# Patient Record
Sex: MALE | Race: OTHER | Employment: FULL TIME | URBAN - METROPOLITAN AREA
[De-identification: names, ages, dates, MRNs, and addresses within clinical notes are randomized per-mention and may not be internally consistent; named-entity substitution may affect disease eponyms.]

---

## 2022-04-26 ENCOUNTER — APPOINTMENT (OUTPATIENT)
Dept: CT IMAGING | Age: 42
DRG: 853 | End: 2022-04-26
Attending: EMERGENCY MEDICINE

## 2022-04-26 ENCOUNTER — ANESTHESIA EVENT (OUTPATIENT)
Dept: SURGERY | Age: 42
DRG: 853 | End: 2022-04-26

## 2022-04-26 ENCOUNTER — APPOINTMENT (OUTPATIENT)
Dept: NON INVASIVE DIAGNOSTICS | Age: 42
DRG: 853 | End: 2022-04-26
Attending: SURGERY

## 2022-04-26 ENCOUNTER — HOSPITAL ENCOUNTER (INPATIENT)
Age: 42
LOS: 2 days | Discharge: HOME | DRG: 853 | End: 2022-05-01
Attending: EMERGENCY MEDICINE | Admitting: SURGERY

## 2022-04-26 ENCOUNTER — ANESTHESIA (OUTPATIENT)
Dept: SURGERY | Age: 42
DRG: 853 | End: 2022-04-26

## 2022-04-26 DIAGNOSIS — K65.9 PERITONITIS (HCC): ICD-10-CM

## 2022-04-26 DIAGNOSIS — K35.32 RUPTURED APPENDICITIS: ICD-10-CM

## 2022-04-26 DIAGNOSIS — R10.31 RLQ ABDOMINAL PAIN: ICD-10-CM

## 2022-04-26 DIAGNOSIS — I48.0 PAF (PAROXYSMAL ATRIAL FIBRILLATION) (HCC): ICD-10-CM

## 2022-04-26 DIAGNOSIS — R77.8 ELEVATED TROPONIN: ICD-10-CM

## 2022-04-26 DIAGNOSIS — K35.32 ACUTE APPENDICITIS WITH PERFORATION AND LOCALIZED PERITONITIS, UNSPECIFIED WHETHER ABSCESS PRESENT, UNSPECIFIED WHETHER GANGRENE PRESENT: Primary | ICD-10-CM

## 2022-04-26 DIAGNOSIS — R94.31 EKG ABNORMALITIES: ICD-10-CM

## 2022-04-26 DIAGNOSIS — D72.829 LEUKOCYTOSIS, UNSPECIFIED TYPE: ICD-10-CM

## 2022-04-26 DIAGNOSIS — K35.32 ACUTE APPENDICITIS WITH RUPTURE: ICD-10-CM

## 2022-04-26 DIAGNOSIS — I21.4 NSTEMI (NON-ST ELEVATED MYOCARDIAL INFARCTION) (HCC): ICD-10-CM

## 2022-04-26 LAB
ALBUMIN SERPL-MCNC: 3.1 G/DL (ref 3.5–5)
ALBUMIN/GLOB SERPL: 0.7 {RATIO} (ref 1.2–3.5)
ALP SERPL-CCNC: 120 U/L (ref 50–136)
ALT SERPL-CCNC: 68 U/L (ref 12–65)
ANION GAP SERPL CALC-SCNC: 7 MMOL/L (ref 7–16)
AST SERPL-CCNC: 65 U/L (ref 15–37)
ATRIAL RATE: 66 BPM
ATRIAL RATE: 79 BPM
BASOPHILS # BLD: 0.1 K/UL (ref 0–0.2)
BASOPHILS NFR BLD: 0 % (ref 0–2)
BILIRUB SERPL-MCNC: 0.5 MG/DL (ref 0.2–1.1)
BUN SERPL-MCNC: 13 MG/DL (ref 6–23)
CALCIUM SERPL-MCNC: 8.6 MG/DL (ref 8.3–10.4)
CALCULATED P AXIS, ECG09: 61 DEGREES
CALCULATED P AXIS, ECG09: 63 DEGREES
CALCULATED R AXIS, ECG10: 74 DEGREES
CALCULATED R AXIS, ECG10: 74 DEGREES
CALCULATED T AXIS, ECG11: 71 DEGREES
CALCULATED T AXIS, ECG11: 78 DEGREES
CHLORIDE SERPL-SCNC: 104 MMOL/L (ref 98–107)
CO2 SERPL-SCNC: 27 MMOL/L (ref 21–32)
CREAT SERPL-MCNC: 1 MG/DL (ref 0.8–1.5)
DIAGNOSIS, 93000: NORMAL
DIAGNOSIS, 93000: NORMAL
DIFFERENTIAL METHOD BLD: ABNORMAL
ECHO AO ASC DIAM: 2.2 CM
ECHO AO ASCENDING AORTA INDEX: 1.18 CM/M2
ECHO AO ROOT DIAM: 2.6 CM
ECHO AO ROOT INDEX: 1.39 CM/M2
ECHO AV AREA PEAK VELOCITY: 2.3 CM2
ECHO AV AREA VTI: 2 CM2
ECHO AV AREA/BSA PEAK VELOCITY: 1.2 CM2/M2
ECHO AV AREA/BSA VTI: 1.1 CM2/M2
ECHO AV MEAN GRADIENT: 7 MMHG
ECHO AV MEAN VELOCITY: 1.2 M/S
ECHO AV PEAK GRADIENT: 12 MMHG
ECHO AV PEAK VELOCITY: 1.7 M/S
ECHO AV VELOCITY RATIO: 0.71
ECHO AV VTI: 30.4 CM
ECHO EST RA PRESSURE: 8 MMHG
ECHO IVC EXP: 1.5 CM
ECHO LA AREA 2C: 11.8 CM2
ECHO LA AREA 4C: 17.9 CM2
ECHO LA DIAMETER INDEX: 1.66 CM/M2
ECHO LA DIAMETER: 3.1 CM
ECHO LA MAJOR AXIS: 5.9 CM
ECHO LA MINOR AXIS: 3.9 CM
ECHO LA TO AORTIC ROOT RATIO: 1.19
ECHO LV E' LATERAL VELOCITY: 18 CM/S
ECHO LV E' SEPTAL VELOCITY: 13 CM/S
ECHO LV EDV A2C: 159 ML
ECHO LV EDV A4C: 127 ML
ECHO LV EDV INDEX A4C: 68 ML/M2
ECHO LV EDV NDEX A2C: 85 ML/M2
ECHO LV EJECTION FRACTION A2C: 54 %
ECHO LV EJECTION FRACTION A4C: 61 %
ECHO LV EJECTION FRACTION BIPLANE: 57 % (ref 55–100)
ECHO LV ESV A2C: 73 ML
ECHO LV ESV A4C: 49 ML
ECHO LV ESV INDEX A2C: 39 ML/M2
ECHO LV ESV INDEX A4C: 26 ML/M2
ECHO LV FRACTIONAL SHORTENING: 30 % (ref 28–44)
ECHO LV INTERNAL DIMENSION DIASTOLE INDEX: 2.46 CM/M2
ECHO LV INTERNAL DIMENSION DIASTOLIC: 4.6 CM (ref 4.2–5.9)
ECHO LV INTERNAL DIMENSION SYSTOLIC INDEX: 1.71 CM/M2
ECHO LV INTERNAL DIMENSION SYSTOLIC: 3.2 CM
ECHO LV IVSD: 0.9 CM (ref 0.6–1)
ECHO LV MASS 2D: 108.5 G (ref 88–224)
ECHO LV MASS INDEX 2D: 58 G/M2 (ref 49–115)
ECHO LV POSTERIOR WALL DIASTOLIC: 0.6 CM (ref 0.6–1)
ECHO LV RELATIVE WALL THICKNESS RATIO: 0.26
ECHO LVOT AREA: 3.1 CM2
ECHO LVOT AV VTI INDEX: 0.65
ECHO LVOT DIAM: 2 CM
ECHO LVOT MEAN GRADIENT: 3 MMHG
ECHO LVOT PEAK GRADIENT: 6 MMHG
ECHO LVOT PEAK VELOCITY: 1.2 M/S
ECHO LVOT STROKE VOLUME INDEX: 33.1 ML/M2
ECHO LVOT SV: 61.9 ML
ECHO LVOT VTI: 19.7 CM
ECHO MV A VELOCITY: 0.7 M/S
ECHO MV E DECELERATION TIME (DT): 185 MS
ECHO MV E VELOCITY: 1.08 M/S
ECHO MV E/A RATIO: 1.54
ECHO MV E/E' LATERAL: 6
ECHO MV E/E' RATIO (AVERAGED): 7.15
ECHO MV E/E' SEPTAL: 8.31
ECHO PV ACCELERATION TIME (AT): 148 MS
ECHO PV MAX VELOCITY: 1.4 M/S
ECHO PV PEAK GRADIENT: 7 MMHG
ECHO RIGHT VENTRICULAR SYSTOLIC PRESSURE (RVSP): 33 MMHG
ECHO RV BASAL DIMENSION: 3.5 CM
ECHO RV INTERNAL DIMENSION: 2.6 CM
ECHO RV TAPSE: 1.7 CM (ref 1.7–?)
ECHO TV REGURGITANT MAX VELOCITY: 2.48 M/S
ECHO TV REGURGITANT PEAK GRADIENT: 25 MMHG
EOSINOPHIL # BLD: 0 K/UL (ref 0–0.8)
EOSINOPHIL NFR BLD: 0 % (ref 0.5–7.8)
ERYTHROCYTE [DISTWIDTH] IN BLOOD BY AUTOMATED COUNT: 13.1 % (ref 11.9–14.6)
GLOBULIN SER CALC-MCNC: 4.4 G/DL (ref 2.3–3.5)
GLUCOSE SERPL-MCNC: 125 MG/DL (ref 65–100)
HCT VFR BLD AUTO: 41.2 % (ref 41.1–50.3)
HGB BLD-MCNC: 13.5 G/DL (ref 13.6–17.2)
IMM GRANULOCYTES # BLD AUTO: 0.1 K/UL (ref 0–0.5)
IMM GRANULOCYTES NFR BLD AUTO: 1 % (ref 0–5)
LACTATE SERPL-SCNC: 0.7 MMOL/L (ref 0.4–2)
LIPASE SERPL-CCNC: 300 U/L (ref 73–393)
LYMPHOCYTES # BLD: 1.5 K/UL (ref 0.5–4.6)
LYMPHOCYTES NFR BLD: 9 % (ref 13–44)
MCH RBC QN AUTO: 29.6 PG (ref 26.1–32.9)
MCHC RBC AUTO-ENTMCNC: 32.8 G/DL (ref 31.4–35)
MCV RBC AUTO: 90.4 FL (ref 79.6–97.8)
MONOCYTES # BLD: 1.3 K/UL (ref 0.1–1.3)
MONOCYTES NFR BLD: 7 % (ref 4–12)
NEUTS SEG # BLD: 14.9 K/UL (ref 1.7–8.2)
NEUTS SEG NFR BLD: 83 % (ref 43–78)
NRBC # BLD: 0 K/UL (ref 0–0.2)
P-R INTERVAL, ECG05: 126 MS
P-R INTERVAL, ECG05: 126 MS
PLATELET # BLD AUTO: 209 K/UL (ref 150–450)
PMV BLD AUTO: 11.4 FL (ref 9.4–12.3)
POTASSIUM SERPL-SCNC: 3.6 MMOL/L (ref 3.5–5.1)
PROT SERPL-MCNC: 7.5 G/DL (ref 6.3–8.2)
Q-T INTERVAL, ECG07: 348 MS
Q-T INTERVAL, ECG07: 368 MS
QRS DURATION, ECG06: 86 MS
QRS DURATION, ECG06: 88 MS
QTC CALCULATION (BEZET), ECG08: 385 MS
QTC CALCULATION (BEZET), ECG08: 399 MS
RBC # BLD AUTO: 4.56 M/UL (ref 4.23–5.6)
SODIUM SERPL-SCNC: 138 MMOL/L (ref 136–145)
TROPONIN-HIGH SENSITIVITY: 3553.4 PG/ML (ref 0–14)
TROPONIN-HIGH SENSITIVITY: 5619.2 PG/ML (ref 0–14)
VENTRICULAR RATE, ECG03: 66 BPM
VENTRICULAR RATE, ECG03: 79 BPM
WBC # BLD AUTO: 18 K/UL (ref 4.3–11.1)

## 2022-04-26 PROCEDURE — 74011000258 HC RX REV CODE- 258: Performed by: EMERGENCY MEDICINE

## 2022-04-26 PROCEDURE — 85025 COMPLETE CBC W/AUTO DIFF WBC: CPT

## 2022-04-26 PROCEDURE — 77030007955 HC PCH ENDOSC SPEC J&J -B: Performed by: SURGERY

## 2022-04-26 PROCEDURE — 77030034849: Performed by: SURGERY

## 2022-04-26 PROCEDURE — 74011000250 HC RX REV CODE- 250: Performed by: STUDENT IN AN ORGANIZED HEALTH CARE EDUCATION/TRAINING PROGRAM

## 2022-04-26 PROCEDURE — 76210000016 HC OR PH I REC 1 TO 1.5 HR: Performed by: SURGERY

## 2022-04-26 PROCEDURE — 70450 CT HEAD/BRAIN W/O DYE: CPT

## 2022-04-26 PROCEDURE — 76010000161 HC OR TIME 1 TO 1.5 HR INTENSV-TIER 1: Performed by: SURGERY

## 2022-04-26 PROCEDURE — G0378 HOSPITAL OBSERVATION PER HR: HCPCS

## 2022-04-26 PROCEDURE — 77030008608 HC TRCR ENDOSC SMTH AMR -B: Performed by: SURGERY

## 2022-04-26 PROCEDURE — 77030040504 HC DRN WND MDII -B: Performed by: SURGERY

## 2022-04-26 PROCEDURE — 96375 TX/PRO/DX INJ NEW DRUG ADDON: CPT

## 2022-04-26 PROCEDURE — 80053 COMPREHEN METABOLIC PANEL: CPT

## 2022-04-26 PROCEDURE — 77030039425 HC BLD LARYNG TRULITE DISP TELE -A: Performed by: ANESTHESIOLOGY

## 2022-04-26 PROCEDURE — 74011250636 HC RX REV CODE- 250/636: Performed by: NURSE ANESTHETIST, CERTIFIED REGISTERED

## 2022-04-26 PROCEDURE — 74011250636 HC RX REV CODE- 250/636: Performed by: ANESTHESIOLOGY

## 2022-04-26 PROCEDURE — 77030008606 HC TRCR ENDOSC KII AMR -B: Performed by: SURGERY

## 2022-04-26 PROCEDURE — 93005 ELECTROCARDIOGRAM TRACING: CPT | Performed by: EMERGENCY MEDICINE

## 2022-04-26 PROCEDURE — 77030000038 HC TIP SCIS LAPSCP SURI -B: Performed by: SURGERY

## 2022-04-26 PROCEDURE — 77030008756 HC TU IRR SUC STRY -B: Performed by: SURGERY

## 2022-04-26 PROCEDURE — 74177 CT ABD & PELVIS W/CONTRAST: CPT

## 2022-04-26 PROCEDURE — 2709999900 HC NON-CHARGEABLE SUPPLY: Performed by: SURGERY

## 2022-04-26 PROCEDURE — 77030021158 HC TRCR BLN GELPRT AMR -B: Performed by: SURGERY

## 2022-04-26 PROCEDURE — 83690 ASSAY OF LIPASE: CPT

## 2022-04-26 PROCEDURE — 77030020829: Performed by: SURGERY

## 2022-04-26 PROCEDURE — 74011250636 HC RX REV CODE- 250/636: Performed by: SURGERY

## 2022-04-26 PROCEDURE — 96376 TX/PRO/DX INJ SAME DRUG ADON: CPT

## 2022-04-26 PROCEDURE — 44970 LAPAROSCOPY APPENDECTOMY: CPT | Performed by: SURGERY

## 2022-04-26 PROCEDURE — 87075 CULTR BACTERIA EXCEPT BLOOD: CPT

## 2022-04-26 PROCEDURE — 74011000250 HC RX REV CODE- 250: Performed by: NURSE ANESTHETIST, CERTIFIED REGISTERED

## 2022-04-26 PROCEDURE — 77030040506 HC DRN WND MDII -A: Performed by: SURGERY

## 2022-04-26 PROCEDURE — 99220 PR INITIAL OBSERVATION CARE/DAY 70 MINUTES: CPT | Performed by: SURGERY

## 2022-04-26 PROCEDURE — 77030008462 HC STPLR SKN PROX J&J -A: Performed by: SURGERY

## 2022-04-26 PROCEDURE — 77030009967 HC RELD STPLR ENDOSC J&J -C: Performed by: SURGERY

## 2022-04-26 PROCEDURE — 74011000250 HC RX REV CODE- 250: Performed by: SURGERY

## 2022-04-26 PROCEDURE — 81003 URINALYSIS AUTO W/O SCOPE: CPT

## 2022-04-26 PROCEDURE — 84484 ASSAY OF TROPONIN QUANT: CPT

## 2022-04-26 PROCEDURE — 77030031139 HC SUT VCRL2 J&J -A: Performed by: SURGERY

## 2022-04-26 PROCEDURE — 87070 CULTURE OTHR SPECIMN AEROBIC: CPT

## 2022-04-26 PROCEDURE — 74011250636 HC RX REV CODE- 250/636: Performed by: EMERGENCY MEDICINE

## 2022-04-26 PROCEDURE — 96365 THER/PROPH/DIAG IV INF INIT: CPT

## 2022-04-26 PROCEDURE — 99285 EMERGENCY DEPT VISIT HI MDM: CPT

## 2022-04-26 PROCEDURE — 77030008522 HC TBNG INSUF LAPRO STRY -B: Performed by: SURGERY

## 2022-04-26 PROCEDURE — 76060000033 HC ANESTHESIA 1 TO 1.5 HR: Performed by: SURGERY

## 2022-04-26 PROCEDURE — 77030002996 HC SUT SLK J&J -A: Performed by: SURGERY

## 2022-04-26 PROCEDURE — 74011000258 HC RX REV CODE- 258: Performed by: SURGERY

## 2022-04-26 PROCEDURE — 77030022703 HC LIGASURE  BLNT LAPSCP SEAL COVD -E: Performed by: SURGERY

## 2022-04-26 PROCEDURE — 88304 TISSUE EXAM BY PATHOLOGIST: CPT

## 2022-04-26 PROCEDURE — 74011000636 HC RX REV CODE- 636: Performed by: EMERGENCY MEDICINE

## 2022-04-26 PROCEDURE — 87040 BLOOD CULTURE FOR BACTERIA: CPT

## 2022-04-26 PROCEDURE — 87077 CULTURE AEROBIC IDENTIFY: CPT

## 2022-04-26 PROCEDURE — C8929 TTE W OR WO FOL WCON,DOPPLER: HCPCS

## 2022-04-26 PROCEDURE — 87186 SC STD MICRODIL/AGAR DIL: CPT

## 2022-04-26 PROCEDURE — 74011250637 HC RX REV CODE- 250/637: Performed by: ANESTHESIOLOGY

## 2022-04-26 PROCEDURE — 74011250637 HC RX REV CODE- 250/637: Performed by: SURGERY

## 2022-04-26 PROCEDURE — 83605 ASSAY OF LACTIC ACID: CPT

## 2022-04-26 PROCEDURE — 99222 1ST HOSP IP/OBS MODERATE 55: CPT | Performed by: INTERNAL MEDICINE

## 2022-04-26 PROCEDURE — 77030040830 HC CATH URETH FOL MDII -A: Performed by: SURGERY

## 2022-04-26 PROCEDURE — 77030011810 HC STPLR ENDOSC J&J -G: Performed by: SURGERY

## 2022-04-26 PROCEDURE — 77030037088 HC TUBE ENDOTRACH ORAL NSL COVD-A: Performed by: ANESTHESIOLOGY

## 2022-04-26 PROCEDURE — 0DTJ4ZZ RESECTION OF APPENDIX, PERCUTANEOUS ENDOSCOPIC APPROACH: ICD-10-PCS | Performed by: SURGERY

## 2022-04-26 PROCEDURE — 77030008771 HC TU NG SALEM SUMP -A: Performed by: ANESTHESIOLOGY

## 2022-04-26 PROCEDURE — 77030012770 HC TRCR OPT FX AMR -B: Performed by: SURGERY

## 2022-04-26 RX ORDER — OXYCODONE HYDROCHLORIDE 5 MG/1
10 TABLET ORAL
Status: COMPLETED | OUTPATIENT
Start: 2022-04-26 | End: 2022-04-26

## 2022-04-26 RX ORDER — ONDANSETRON 2 MG/ML
4 INJECTION INTRAMUSCULAR; INTRAVENOUS
Status: COMPLETED | OUTPATIENT
Start: 2022-04-26 | End: 2022-04-26

## 2022-04-26 RX ORDER — ROCURONIUM BROMIDE 10 MG/ML
INJECTION, SOLUTION INTRAVENOUS AS NEEDED
Status: DISCONTINUED | OUTPATIENT
Start: 2022-04-26 | End: 2022-04-26 | Stop reason: HOSPADM

## 2022-04-26 RX ORDER — LIDOCAINE HYDROCHLORIDE 20 MG/ML
INJECTION, SOLUTION EPIDURAL; INFILTRATION; INTRACAUDAL; PERINEURAL AS NEEDED
Status: DISCONTINUED | OUTPATIENT
Start: 2022-04-26 | End: 2022-04-26 | Stop reason: HOSPADM

## 2022-04-26 RX ORDER — SODIUM CHLORIDE 0.9 % (FLUSH) 0.9 %
10 SYRINGE (ML) INJECTION
Status: COMPLETED | OUTPATIENT
Start: 2022-04-26 | End: 2022-04-26

## 2022-04-26 RX ORDER — SODIUM CHLORIDE 0.9 % (FLUSH) 0.9 %
5-10 SYRINGE (ML) INJECTION EVERY 8 HOURS
Status: DISCONTINUED | OUTPATIENT
Start: 2022-04-26 | End: 2022-05-01 | Stop reason: HOSPADM

## 2022-04-26 RX ORDER — SODIUM CHLORIDE 0.9 % (FLUSH) 0.9 %
5-10 SYRINGE (ML) INJECTION AS NEEDED
Status: DISCONTINUED | OUTPATIENT
Start: 2022-04-26 | End: 2022-05-01 | Stop reason: HOSPADM

## 2022-04-26 RX ORDER — SODIUM CHLORIDE, SODIUM LACTATE, POTASSIUM CHLORIDE, CALCIUM CHLORIDE 600; 310; 30; 20 MG/100ML; MG/100ML; MG/100ML; MG/100ML
100 INJECTION, SOLUTION INTRAVENOUS CONTINUOUS
Status: DISCONTINUED | OUTPATIENT
Start: 2022-04-26 | End: 2022-04-26 | Stop reason: HOSPADM

## 2022-04-26 RX ORDER — FAMOTIDINE 20 MG/1
20 TABLET, FILM COATED ORAL 2 TIMES DAILY
COMMUNITY

## 2022-04-26 RX ORDER — HYDROCODONE BITARTRATE AND ACETAMINOPHEN 5; 325 MG/1; MG/1
1-2 TABLET ORAL
Qty: 28 TABLET | Refills: 0 | Status: SHIPPED | OUTPATIENT
Start: 2022-04-26 | End: 2022-05-03

## 2022-04-26 RX ORDER — NEOSTIGMINE METHYLSULFATE 1 MG/ML
INJECTION, SOLUTION INTRAVENOUS AS NEEDED
Status: DISCONTINUED | OUTPATIENT
Start: 2022-04-26 | End: 2022-04-26 | Stop reason: HOSPADM

## 2022-04-26 RX ORDER — PROPOFOL 10 MG/ML
INJECTION, EMULSION INTRAVENOUS AS NEEDED
Status: DISCONTINUED | OUTPATIENT
Start: 2022-04-26 | End: 2022-04-26 | Stop reason: HOSPADM

## 2022-04-26 RX ORDER — SODIUM CHLORIDE, SODIUM LACTATE, POTASSIUM CHLORIDE, CALCIUM CHLORIDE 600; 310; 30; 20 MG/100ML; MG/100ML; MG/100ML; MG/100ML
150 INJECTION, SOLUTION INTRAVENOUS CONTINUOUS
Status: DISCONTINUED | OUTPATIENT
Start: 2022-04-26 | End: 2022-04-27

## 2022-04-26 RX ORDER — EPHEDRINE SULFATE/0.9% NACL/PF 50 MG/5 ML
SYRINGE (ML) INTRAVENOUS AS NEEDED
Status: DISCONTINUED | OUTPATIENT
Start: 2022-04-26 | End: 2022-04-26 | Stop reason: HOSPADM

## 2022-04-26 RX ORDER — SUCCINYLCHOLINE CHLORIDE 20 MG/ML
INJECTION INTRAMUSCULAR; INTRAVENOUS AS NEEDED
Status: DISCONTINUED | OUTPATIENT
Start: 2022-04-26 | End: 2022-04-26 | Stop reason: HOSPADM

## 2022-04-26 RX ORDER — GLYCOPYRROLATE 0.2 MG/ML
INJECTION INTRAMUSCULAR; INTRAVENOUS AS NEEDED
Status: DISCONTINUED | OUTPATIENT
Start: 2022-04-26 | End: 2022-04-26 | Stop reason: HOSPADM

## 2022-04-26 RX ORDER — DICLOFENAC SODIUM 50 MG/1
50 TABLET, DELAYED RELEASE ORAL 2 TIMES DAILY
COMMUNITY

## 2022-04-26 RX ORDER — AMOXICILLIN AND CLAVULANATE POTASSIUM 875; 125 MG/1; MG/1
1 TABLET, FILM COATED ORAL 2 TIMES DAILY
COMMUNITY

## 2022-04-26 RX ORDER — OXYCODONE HYDROCHLORIDE 5 MG/1
5-10 TABLET ORAL
Status: DISCONTINUED | OUTPATIENT
Start: 2022-04-26 | End: 2022-05-01 | Stop reason: HOSPADM

## 2022-04-26 RX ORDER — MORPHINE SULFATE 4 MG/ML
2-6 INJECTION INTRAVENOUS
Status: DISCONTINUED | OUTPATIENT
Start: 2022-04-26 | End: 2022-05-01 | Stop reason: HOSPADM

## 2022-04-26 RX ORDER — ONDANSETRON 4 MG/1
4 TABLET, FILM COATED ORAL
COMMUNITY

## 2022-04-26 RX ORDER — MORPHINE SULFATE 4 MG/ML
4 INJECTION INTRAVENOUS ONCE
Status: COMPLETED | OUTPATIENT
Start: 2022-04-26 | End: 2022-04-26

## 2022-04-26 RX ORDER — ONDANSETRON 2 MG/ML
INJECTION INTRAMUSCULAR; INTRAVENOUS AS NEEDED
Status: DISCONTINUED | OUTPATIENT
Start: 2022-04-26 | End: 2022-04-26 | Stop reason: HOSPADM

## 2022-04-26 RX ORDER — BUPIVACAINE HYDROCHLORIDE 2.5 MG/ML
INJECTION, SOLUTION EPIDURAL; INFILTRATION; INTRACAUDAL AS NEEDED
Status: DISCONTINUED | OUTPATIENT
Start: 2022-04-26 | End: 2022-04-26 | Stop reason: HOSPADM

## 2022-04-26 RX ORDER — ACETAMINOPHEN 500 MG
1000 TABLET ORAL
Status: DISCONTINUED | OUTPATIENT
Start: 2022-04-26 | End: 2022-05-01 | Stop reason: HOSPADM

## 2022-04-26 RX ORDER — PROCHLORPERAZINE EDISYLATE 5 MG/ML
2.5 INJECTION INTRAMUSCULAR; INTRAVENOUS
Status: DISCONTINUED | OUTPATIENT
Start: 2022-04-26 | End: 2022-04-26 | Stop reason: HOSPADM

## 2022-04-26 RX ORDER — HYDROMORPHONE HYDROCHLORIDE 1 MG/ML
0.5 INJECTION, SOLUTION INTRAMUSCULAR; INTRAVENOUS; SUBCUTANEOUS
Status: DISCONTINUED | OUTPATIENT
Start: 2022-04-26 | End: 2022-04-26 | Stop reason: HOSPADM

## 2022-04-26 RX ORDER — FENTANYL CITRATE 50 UG/ML
INJECTION, SOLUTION INTRAMUSCULAR; INTRAVENOUS AS NEEDED
Status: DISCONTINUED | OUTPATIENT
Start: 2022-04-26 | End: 2022-04-26 | Stop reason: HOSPADM

## 2022-04-26 RX ADMIN — MORPHINE SULFATE 4 MG: 4 INJECTION INTRAVENOUS at 21:45

## 2022-04-26 RX ADMIN — ONDANSETRON 4 MG: 2 INJECTION INTRAMUSCULAR; INTRAVENOUS at 04:02

## 2022-04-26 RX ADMIN — SODIUM CHLORIDE 1000 ML: 900 INJECTION, SOLUTION INTRAVENOUS at 05:23

## 2022-04-26 RX ADMIN — Medication 10 MG: at 07:07

## 2022-04-26 RX ADMIN — IOPAMIDOL 100 ML: 755 INJECTION, SOLUTION INTRAVENOUS at 04:41

## 2022-04-26 RX ADMIN — SODIUM CHLORIDE 100 ML: 9 INJECTION, SOLUTION INTRAVENOUS at 04:40

## 2022-04-26 RX ADMIN — OXYCODONE HYDROCHLORIDE 10 MG: 5 TABLET ORAL at 13:20

## 2022-04-26 RX ADMIN — PIPERACILLIN AND TAZOBACTAM 4.5 G: 4; .5 INJECTION, POWDER, LYOPHILIZED, FOR SOLUTION INTRAVENOUS at 05:25

## 2022-04-26 RX ADMIN — Medication 10 ML: at 04:41

## 2022-04-26 RX ADMIN — OXYCODONE HYDROCHLORIDE 10 MG: 5 TABLET ORAL at 19:27

## 2022-04-26 RX ADMIN — MORPHINE SULFATE 4 MG: 4 INJECTION INTRAVENOUS at 23:23

## 2022-04-26 RX ADMIN — SUCCINYLCHOLINE CHLORIDE 140 MG: 20 INJECTION, SOLUTION INTRAMUSCULAR; INTRAVENOUS at 06:51

## 2022-04-26 RX ADMIN — PIPERACILLIN AND TAZOBACTAM 3.38 G: 3; .375 INJECTION, POWDER, LYOPHILIZED, FOR SOLUTION INTRAVENOUS at 20:24

## 2022-04-26 RX ADMIN — ROCURONIUM BROMIDE 15 MG: 50 INJECTION, SOLUTION INTRAVENOUS at 06:59

## 2022-04-26 RX ADMIN — HYDROMORPHONE HYDROCHLORIDE 0.5 MG: 1 INJECTION, SOLUTION INTRAMUSCULAR; INTRAVENOUS; SUBCUTANEOUS at 08:20

## 2022-04-26 RX ADMIN — FENTANYL CITRATE 100 MCG: 50 INJECTION INTRAMUSCULAR; INTRAVENOUS at 06:51

## 2022-04-26 RX ADMIN — LIDOCAINE HYDROCHLORIDE 80 MG: 20 INJECTION, SOLUTION EPIDURAL; INFILTRATION; INTRACAUDAL; PERINEURAL at 06:51

## 2022-04-26 RX ADMIN — SODIUM CHLORIDE 1000 ML: 900 INJECTION, SOLUTION INTRAVENOUS at 04:02

## 2022-04-26 RX ADMIN — SODIUM CHLORIDE, PRESERVATIVE FREE 10 ML: 5 INJECTION INTRAVENOUS at 20:25

## 2022-04-26 RX ADMIN — SODIUM CHLORIDE 500 ML: 900 INJECTION, SOLUTION INTRAVENOUS at 06:18

## 2022-04-26 RX ADMIN — OXYCODONE HYDROCHLORIDE 10 MG: 5 TABLET ORAL at 08:44

## 2022-04-26 RX ADMIN — Medication 5 MG: at 07:51

## 2022-04-26 RX ADMIN — ROCURONIUM BROMIDE 5 MG: 50 INJECTION, SOLUTION INTRAVENOUS at 06:51

## 2022-04-26 RX ADMIN — GLYCOPYRROLATE 0.6 MG: 0.2 INJECTION, SOLUTION INTRAMUSCULAR; INTRAVENOUS at 07:51

## 2022-04-26 RX ADMIN — PIPERACILLIN AND TAZOBACTAM 3.38 G: 3; .375 INJECTION, POWDER, LYOPHILIZED, FOR SOLUTION INTRAVENOUS at 13:20

## 2022-04-26 RX ADMIN — SODIUM CHLORIDE, SODIUM LACTATE, POTASSIUM CHLORIDE, AND CALCIUM CHLORIDE: 600; 310; 30; 20 INJECTION, SOLUTION INTRAVENOUS at 06:46

## 2022-04-26 RX ADMIN — SODIUM CHLORIDE, PRESERVATIVE FREE 20 MG: 5 INJECTION INTRAVENOUS at 20:24

## 2022-04-26 RX ADMIN — MORPHINE SULFATE 4 MG: 4 INJECTION INTRAVENOUS at 05:06

## 2022-04-26 RX ADMIN — SODIUM CHLORIDE, PRESERVATIVE FREE 20 MG: 5 INJECTION INTRAVENOUS at 08:26

## 2022-04-26 RX ADMIN — PERFLUTREN 1 ML: 6.52 INJECTION, SUSPENSION INTRAVENOUS at 10:49

## 2022-04-26 RX ADMIN — ONDANSETRON 4 MG: 2 INJECTION INTRAMUSCULAR; INTRAVENOUS at 07:50

## 2022-04-26 RX ADMIN — HYDROMORPHONE HYDROCHLORIDE 0.5 MG: 1 INJECTION, SOLUTION INTRAMUSCULAR; INTRAVENOUS; SUBCUTANEOUS at 08:25

## 2022-04-26 RX ADMIN — ONDANSETRON 4 MG: 2 INJECTION INTRAMUSCULAR; INTRAVENOUS at 05:07

## 2022-04-26 RX ADMIN — SODIUM CHLORIDE, SODIUM LACTATE, POTASSIUM CHLORIDE, AND CALCIUM CHLORIDE: 600; 310; 30; 20 INJECTION, SOLUTION INTRAVENOUS at 07:27

## 2022-04-26 RX ADMIN — PROPOFOL 200 MG: 10 INJECTION, EMULSION INTRAVENOUS at 06:51

## 2022-04-26 RX ADMIN — ROCURONIUM BROMIDE 10 MG: 50 INJECTION, SOLUTION INTRAVENOUS at 07:13

## 2022-04-26 RX ADMIN — SODIUM CHLORIDE, SODIUM LACTATE, POTASSIUM CHLORIDE, AND CALCIUM CHLORIDE 150 ML/HR: 600; 310; 30; 20 INJECTION, SOLUTION INTRAVENOUS at 23:28

## 2022-04-26 NOTE — PROGRESS NOTES
Report received from [facility] in PACU. Will receive patient into 346 when he arrives on the unit.

## 2022-04-26 NOTE — ED PROVIDER NOTES
28-year-old male with no pertinent past medical history presents with complaint of right lower quadrant pain with associated nausea, vomiting, subjective fever and chills have been intermittent since Friday. States that symptoms initially started in epigastric region/periumbilical region and progressed and now he is having right lower quadrant pain. Denies diarrhea. States that he had hard stool earlier today. Denies dysuria, hematuria, flank pain. Patient denies cough, chest pain, shortness of breath. Denies dysuria. Denies any previous abdominal surgeries. Patient also states that earlier today he had episode of left hand tingling that is since resolved. Denies facial droop, slurred speech, vision disturbance, neck pain. Denies history of TIA, CVA. Denies history of CAD. The history is provided by the patient. No  was used. Abdominal Pain   Associated symptoms include a fever and nausea. Pertinent negatives include no diarrhea, no vomiting, no constipation, no dysuria, no headaches, no arthralgias, no myalgias, no chest pain and no back pain. History reviewed. No pertinent past medical history. No past surgical history on file. History reviewed. No pertinent family history. Social History     Socioeconomic History    Marital status: SINGLE     Spouse name: Not on file    Number of children: Not on file    Years of education: Not on file    Highest education level: Not on file   Occupational History    Not on file   Tobacco Use    Smoking status: Never Smoker    Smokeless tobacco: Never Used   Substance and Sexual Activity    Alcohol use:  Yes    Drug use: Not on file    Sexual activity: Yes     Partners: Female   Other Topics Concern    Not on file   Social History Narrative    Not on file     Social Determinants of Health     Financial Resource Strain:     Difficulty of Paying Living Expenses: Not on file   Food Insecurity:     Worried About Running Out of Food in the Last Year: Not on file    Ran Out of Food in the Last Year: Not on file   Transportation Needs:     Lack of Transportation (Medical): Not on file    Lack of Transportation (Non-Medical): Not on file   Physical Activity:     Days of Exercise per Week: Not on file    Minutes of Exercise per Session: Not on file   Stress:     Feeling of Stress : Not on file   Social Connections:     Frequency of Communication with Friends and Family: Not on file    Frequency of Social Gatherings with Friends and Family: Not on file    Attends Judaism Services: Not on file    Active Member of [address] [place] [address] [employer] or Organizations: Not on file    Attends Club or Organization Meetings: Not on file    Marital Status: Not on file   Intimate Partner Violence:     Fear of Current or Ex-Partner: Not on file    Emotionally Abused: Not on file    Physically Abused: Not on file    Sexually Abused: Not on file   Housing Stability:     Unable to Pay for Housing in the Last Year: Not on file    Number of [place] in the Last Year: Not on file    Unstable Housing in the Last Year: Not on file         ALLERGIES: Patient has no known allergies. Review of Systems   Constitutional: Positive for chills, fatigue and fever. Negative for diaphoresis. HENT: Negative for congestion, rhinorrhea, sore throat, trouble swallowing and voice change. Respiratory: Negative for cough and shortness of breath. Cardiovascular: Negative for chest pain and palpitations. Gastrointestinal: Positive for abdominal pain and nausea. Negative for blood in stool, constipation, diarrhea, rectal pain and vomiting. Genitourinary: Negative for dysuria and flank pain. Musculoskeletal: Negative for arthralgias, back pain, myalgias, neck pain and neck stiffness. Skin: Negative for rash and wound. Neurological: Negative for tremors, seizures, syncope, facial asymmetry, speech difficulty, weakness and headaches.    Hematological: Does not bruise/bleed easily. Psychiatric/Behavioral: Negative for confusion. Vitals:    04/26/22 0313   BP: 129/66   Pulse: 98   Resp: 16   Temp: 99.5 °F (37.5 °C)   SpO2: 97%   Weight: 69.4 kg (153 lb)   Height: 5' 7\" (1.702 m)            Physical Exam  Vitals and nursing note reviewed. Constitutional:       Appearance: He is well-developed. HENT:      Head: Normocephalic. Mouth/Throat:      Mouth: Mucous membranes are moist.      Pharynx: Oropharynx is clear. Eyes:      Extraocular Movements: Extraocular movements intact. Pupils: Pupils are equal, round, and reactive to light. Comments: No nystagmus. Cardiovascular:      Rate and Rhythm: Normal rate and regular rhythm. Heart sounds: Normal heart sounds. Comments: Pulses 2+ throughout. Pulmonary:      Effort: Pulmonary effort is normal.      Breath sounds: Normal breath sounds. Abdominal:      General: Abdomen is flat. Bowel sounds are normal.      Palpations: Abdomen is soft. Tenderness: There is abdominal tenderness in the right lower quadrant. There is no right CVA tenderness, left CVA tenderness, guarding or rebound. Negative signs include Marcano's sign. Comments: Moderate right lower quadrant tenderness to palpation. No rebound or guarding. No CVA tenderness. Skin:     General: Skin is warm. Coloration: Skin is not pale. Findings: No rash. Neurological:      General: No focal deficit present. Mental Status: He is alert and oriented to person, place, and time. Cranial Nerves: No cranial nerve deficit. Motor: No weakness. Comments: Strength 5/5 throughout. Normal sensory exam. No focal deficits. No drift.  strength 5/5 bilaterally. No dysarthria.   No aphasia          MDM  Number of Diagnoses or Management Options  Acute appendicitis with perforation and localized peritonitis, unspecified whether abscess present, unspecified whether gangrene present: new and requires workup  NSTEMI (non-ST elevated myocardial infarction) [facility]): new and requires workup  Diagnosis management comments: Vital signs stable. Leukocytosis noted. CT abdomen pelvis with findings of acute appendicitis with likely perforation. CT head unremarkable. Patient NPO. Patient given 30 cc/kg IV fluid bolus and cover with Zosyn 4.5 g IV. EKG with normal sinus rhythm. J-point elevation and findings consistent with early repolarization. Initial troponin was noted to be significantly elevated at 3553. 4. Contacted [facility] cardiology and spoke with Dr. Misha Garcia. States not significant concern from a cardiac standpoint and to have team order echo but do not need to delay the care he needs in regards to his underlying appendicitis. Patient denies chest pain, shortness of breath. States elevated troponin could be secondary to extremis/pain associated with appendicitis. Recommends hold heparin at this time. Updated general surgery on plan.          Amount and/or Complexity of Data Reviewed  Clinical lab tests: ordered and reviewed  Tests in the radiology section of CPT®: ordered and reviewed  Tests in the medicine section of CPT®: ordered and reviewed  Discuss the patient with other providers: yes  Independent visualization of images, tracings, or specimens: yes    Risk of Complications, Morbidity, and/or Mortality  Presenting problems: high  Diagnostic procedures: high  Management options: high  General comments: Results Include:    Recent Results (from the past 24 hour(s))  -CBC WITH AUTOMATED DIFF:   Collection Time: 04/26/22  3:34 AM       Result                      Value             Ref Range           WBC                         18.0 (H)          4.3 - 11.1 K*       RBC                         4.56              4.23 - 5.6 M*       HGB                         13.5 (L)          13.6 - 17.2 *       HCT                         41.2              41.1 - 50.3 %       MCV                         90.4 79.6 - 97.8 *       MCH                         29.6              26.1 - 32.9 *       MCHC                        32.8              31.4 - 35.0 *       RDW                         13.1              11.9 - 14.6 %       PLATELET                    209               150 - 450 K/*       MPV                         11.4              9.4 - 12.3 FL       ABSOLUTE NRBC               0.00              0.0 - 0.2 K/*       DF                          AUTOMATED                             NEUTROPHILS                 83 (H)            43 - 78 %           LYMPHOCYTES                 9 (L)             13 - 44 %           MONOCYTES                   7                 4.0 - 12.0 %        EOSINOPHILS                 0 (L)             0.5 - 7.8 %         BASOPHILS                   0                 0.0 - 2.0 %         IMMATURE GRANULOCYTES       1                 0.0 - 5.0 %         ABS. NEUTROPHILS            14.9 (H)          1.7 - 8.2 K/*       ABS. LYMPHOCYTES            1.5               0.5 - 4.6 K/*       ABS. MONOCYTES              1.3               0.1 - 1.3 K/*       ABS. EOSINOPHILS            0.0               0.0 - 0.8 K/*       ABS. BASOPHILS              0.1               0.0 - 0.2 K/*       ABS. IMM.  GRANS.            0.1               0.0 - 0.5 K/*  -METABOLIC PANEL, COMPREHENSIVE:   Collection Time: 04/26/22  3:34 AM       Result                      Value             Ref Range           Sodium                      138               136 - 145 mm*       Potassium                   3.6               3.5 - 5.1 mm*       Chloride                    104               98 - 107 mmo*       CO2                         27                21 - 32 mmol*       Anion gap                   7                 7 - 16 mmol/L       Glucose                     125 (H)           65 - 100 mg/*       BUN                         13                6 - 23 MG/DL        Creatinine                  1.00              0.8 - 1.5 MG*       GFR est AA                  >60               >60 ml/min/1*       GFR est non-AA              >60               >60 ml/min/1*       Calcium                     8.6               8.3 - 10.4 M*       Bilirubin, total            0.5               0.2 - 1.1 MG*       ALT (SGPT)                  68 (H)            12 - 65 U/L         AST (SGOT)                  65 (H)            15 - 37 U/L         Alk. phosphatase            120               50 - 136 U/L        Protein, total              7.5               6.3 - 8.2 g/*       Albumin                     3.1 (L)           3.5 - 5.0 g/*       Globulin                    4.4 (H)           2.3 - 3.5 g/*       A-G Ratio                   0.7 (L)           1.2 - 3.5      -LIPASE:   Collection Time: 04/26/22  3:34 AM       Result                      Value             Ref Range           Lipase                      300               73 - 393 U/L   -TROPONIN-HIGH SENSITIVITY:   Collection Time: 04/26/22  3:34 AM       Result                      Value             Ref Range           Troponin-High Sensitiv*     3,553.4 (HH)      0 - 14 pg/mL       Patient Progress  Patient progress: stable    ED Course as of 04/26/22 0517 Tue Apr 26, 2022   0359 WBC(!): 18.0 [DF]   0457 CT abdomen & pelvis IMPRESSION  Acute appendicitis, with a few suspected tiny bubbles of  extraluminal air raising the possibility of perforation. Dr. Chakraborty Grade verbally  notified at 4:52 AM.    [DF]   6658 CT head  FINDINGS: Brain volume is appropriate for age. No acute infarct, hemorrhage or  mass is identified. There is no mass effect, midline shift or depressed  fracture. The visualized paranasal sinuses and mastoid air cells are clear,  except for mild mucosal thickening floor the right maxillary sinus.     IMPRESSION  No acute process. [DF]   4681 Troponin-High Sensitivity(!!): 5,616.3 [DF]      ED Course User Index  [DF] Nadiya Wadsworth MD       Critical Care  Performed by:  Nadiya Wadsworth, MD  Authorized by: Joslyn Case MD     Critical care provider statement:     Critical care time (minutes):  39    Critical care time was exclusive of:  Separately billable procedures and treating other patients    Critical care was necessary to treat or prevent imminent or life-threatening deterioration of the following conditions:  Cardiac failure, circulatory failure, CNS failure or compromise, sepsis and metabolic crisis    Critical care was time spent personally by me on the following activities:  Blood draw for specimens, development of treatment plan with patient or surrogate, discussions with consultants, discussions with primary provider, evaluation of patient's response to treatment, examination of patient, obtaining history from patient or surrogate, ordering and performing treatments and interventions, ordering and review of laboratory studies, ordering and review of radiographic studies, pulse oximetry, re-evaluation of patient's condition and review of old charts    I assumed direction of critical care for this patient from another provider in my specialty: yes                       Laurita Baumgarten., MD; 4/26/2022 @3:51 AM Voice dictation software was used during the making of this note. This software is not perfect and grammatical and other typographical errors may be present.   This note has not been proofread for errors.  ===================================================================

## 2022-04-26 NOTE — ANESTHESIA PREPROCEDURE EVALUATION
Relevant Problems   No relevant active problems       Anesthetic History   No history of anesthetic complications            Review of Systems / Medical History  Patient summary reviewed and pertinent labs reviewed    Pulmonary  Within defined limits                 Neuro/Psych   Within defined limits           Cardiovascular  Within defined limits                Exercise tolerance: >4 METS  Comments: Elevated HS Troponin in ED. EKG shows NSR with early repolarization. Dr. Isra alvarez consulted by ED and feels like elevated troponin likely due to acute abdomen/appendiceal rupture. Pt without any cardiac symptoms.    GI/Hepatic/Renal  Within defined limits              Endo/Other  Within defined limits           Other Findings              Physical Exam    Airway  Mallampati: II  TM Distance: > 6 cm  Neck ROM: normal range of motion   Mouth opening: Normal     Cardiovascular    Rhythm: regular  Rate: normal         Dental  No notable dental hx       Pulmonary  Breath sounds clear to auscultation               Abdominal         Other Findings            Anesthetic Plan    ASA: 2, emergent              Anesthetic plan and risks discussed with: Patient

## 2022-04-26 NOTE — ANESTHESIA POSTPROCEDURE EVALUATION
Procedure(s):  APPENDECTOMY LAPAROSCOPIC.     general    Anesthesia Post Evaluation      Multimodal analgesia: multimodal analgesia used between 6 hours prior to anesthesia start to PACU discharge  Patient location during evaluation: PACU  Patient participation: complete - patient participated  Level of consciousness: sleepy but conscious  Pain management: adequate  Airway patency: patent  Anesthetic complications: no  Cardiovascular status: acceptable  Respiratory status: acceptable  Hydration status: acceptable  Post anesthesia nausea and vomiting:  none      INITIAL Post-op Vital signs:   Vitals Value Taken Time   /64 04/26/22 0910   Temp 37.2 °C (99 °F) 04/26/22 0805   Pulse 70 04/26/22 0910   Resp 16 04/26/22 0910   SpO2 97 % 04/26/22 0910

## 2022-04-26 NOTE — PROGRESS NOTES
Medical record reviewed. Pt is a 47 y/o male admitted for a ruptured appendix. CM met briefly with patient. Both he and his girlfriend were fatigued and requested that CM return tomorrow. Will follow up.

## 2022-04-26 NOTE — H&P
H&P/Consult Note/Progress Note/Office Note:   Michael Jaquez  MRN: [id number]  :1980  Age:41 y.o.    HPI: Michael Jaquez is a 46 y.o. male who came to the ER on 22 with a 5 day h/o progressive, constant, severe RLQ pain. He had associated fever but no N/V. No prior abd surgery  WBC 18k  CT as below  Gen Surgery was consulted. Troponin checked secondary to left arm pain and was elevated in ER  Pt denies chest pain  ER contacted [facility] and spoke with Dr. Ann Judd. ER reported cardiology was not concerned about his heart and recommended echo but not to delay the care he needs in regards to his underlying appendicitis        22 CT abd/pelvis with IV contrast  - Liver: Within normal limits. - Gallbladder and bile ducts: Within normal limits. - Spleen: Within normal limits. - Urinary tract: Unremarkable except for a tiny left kidney stone. - Adrenals: Within normal limits. - Pancreas: Within normal limits. - Gastrointestinal tract: The appendix extends inferiorly from the cecum and  contains small calcified appendicoliths. There is thickened at 2 cm in diameter,  with surrounding inflammation and trace fluid. There are also a few suspected  bubbles of extraluminal air raising the possibility of perforation as well. No abscess or bowel obstruction is seen. The colon and small bowel loops are unremarkable. - Retroperitoneum: Within normal limits. - Peritoneal cavity and abdominal wall: Within normal limits. - Pelvis: Within normal limits. - Spine/bones: No acute process. - Other comments: The lung bases are clear.     IMPRESSION  Acute appendicitis, with a few suspected tiny bubbles of extraluminal air raising the possibility of perforation.                  History reviewed. No pertinent past medical history. History reviewed. No pertinent surgical history.   Current Facility-Administered Medications   Medication Dose Route Frequency    sodium chloride (NS) flush 5-10 mL  5-10 mL IntraVENous Q8H    sodium chloride (NS) flush 5-10 mL  5-10 mL IntraVENous PRN    sodium chloride 0.9 % bolus infusion 500 mL  500 mL IntraVENous ONCE     Current Outpatient Medications   Medication Sig    amoxicillin-clavulanate (Augmentin) 875-125 mg per tablet Take 1 Tablet by mouth two (2) times a day.  famotidine (Pepcid) 20 mg tablet Take 20 mg by mouth two (2) times a day.  ondansetron hcl (Zofran) 4 mg tablet Take 4 mg by mouth every eight (8) hours as needed for Nausea or Vomiting.  diclofenac EC (VOLTAREN) 50 mg EC tablet Take 50 mg by mouth two (2) times a day. Patient has no known allergies. Social History     Socioeconomic History    Marital status: SINGLE   Tobacco Use    Smoking status: Never Smoker    Smokeless tobacco: Never Used   Substance and Sexual Activity    Alcohol use: Yes    Sexual activity: Yes     Partners: Female     Social History     Tobacco Use   Smoking Status Never Smoker   Smokeless Tobacco Never Used     History reviewed. No pertinent family history. ROS: The patient has no difficulty with chest pain or shortness of breath. No fever or chills. Comprehensive review of systems was otherwise unremarkable except as noted above. Physical Exam:   Visit Vitals  /77   Pulse 94   Temp 99.5 °F (37.5 °C)   Resp 30   Ht 5' 7\" (1.702 m)   Wt 167 lb (75.8 kg)   SpO2 97%   BMI 26.16 kg/m²     Vitals:    04/26/22 0313 04/26/22 0512 04/26/22 0514   BP: 129/66 124/75 125/77   Pulse: 98 85 94   Resp: 16 25 30   Temp: 99.5 °F (37.5 °C)     SpO2: 97% 96% 97%   Weight: 167 lb (75.8 kg)     Height: 5' 7\" (1.702 m)       No intake/output data recorded. No intake/output data recorded. Constitutional: Alert, oriented, cooperative patient in no acute distress; appears stated age    Eyes:Sclera are clear. EOMs intact  ENMT: no external lesions gross hearing normal; no obvious neck masses, no ear or lip lesions, nares normal  CV: RRR. Normal perfusion  Resp: No JVD. Breathing is  non-labored; no audible wheezing. GI: soft and non-distended; guards RLQ     Musculoskeletal: unremarkable with normal function. No embolic signs or cyanosis. Neuro:  Oriented; moves all 4; no focal deficits  Psychiatric: normal affect and mood, no memory impairment    Recent vitals (if inpt):  Patient Vitals for the past 24 hrs:   BP Temp Pulse Resp SpO2 Height Weight   04/26/22 0514 125/77  94 30 97 %     04/26/22 0512 124/75  85 25 96 %     04/26/22 0313 129/66 99.5 °F (37.5 °C) 98 16 97 % 5' 7\" (1.702 m) 167 lb (75.8 kg)       Amount and/or Complexity of Data Reviewed and Analyzed:  I reviewed and analyzed all of the unique labs and radiologic studies that are shown below as well as any that are in the HPI, and any that are in the expanded problem list below  *Each unique test, order, or document contributes to the combination of 2 or combination of 3 in Category 1 below. For this visit I also reviewed old records and prior notes.       Recent Labs     04/26/22  0334   WBC 18.0*   HGB 13.5*         K 3.6      CO2 27   BUN 13   CREA 1.00   *   TBILI 0.5   ALT 68*      LPSE 300     Review of most recent CBC  Lab Results   Component Value Date/Time    WBC 18.0 (H) 04/26/2022 03:34 AM    HGB 13.5 (L) 04/26/2022 03:34 AM    HCT 41.2 04/26/2022 03:34 AM    PLATELET 455 35/65/0083 03:34 AM    MCV 90.4 04/26/2022 03:34 AM       Review of most recent BMP  Lab Results   Component Value Date/Time    Sodium 138 04/26/2022 03:34 AM    Potassium 3.6 04/26/2022 03:34 AM    Chloride 104 04/26/2022 03:34 AM    CO2 27 04/26/2022 03:34 AM    Anion gap 7 04/26/2022 03:34 AM    Glucose 125 (H) 04/26/2022 03:34 AM    BUN 13 04/26/2022 03:34 AM    Creatinine 1.00 04/26/2022 03:34 AM    GFR est AA >60 04/26/2022 03:34 AM    GFR est non-AA >60 04/26/2022 03:34 AM    Calcium 8.6 04/26/2022 03:34 AM       Review of most recent LFTs (and lipase if done)  Lab Results   Component Value Date/Time    ALT (SGPT) 68 (H) 04/26/2022 03:34 AM    AST (SGOT) 65 (H) 04/26/2022 03:34 AM    Alk. phosphatase 120 04/26/2022 03:34 AM    Bilirubin, total 0.5 04/26/2022 03:34 AM     Lab Results   Component Value Date/Time    Lipase 300 04/26/2022 03:34 AM       No results found for: INR, APTT, CBIL, LCAD, NH4, TROPT, TROIQ, INREXT, INREXT    Review of most recent HgbA1c  No results found for: HBA1C, ULM5PCXX, IMI7LDGB, ONW7JHGN    Nutritional assessment screen for wound healing issues:  Lab Results   Component Value Date/Time    Protein, total 7.5 04/26/2022 03:34 AM    Albumin 3.1 (L) 04/26/2022 03:34 AM       @lastcovr@  XR Results (most recent):  No results found for this or any previous visit. CT Results (most recent):  Results from Hospital Encounter encounter on 04/26/22    CT HEAD WO CONT    Narrative  EXAM: Noncontrast CT head. INDICATION: Headache and arm numbness. COMPARISON: None. TECHNIQUE: Noncontrast CT images of the head were obtained. Radiation dose  reduction techniques were used for this study. Our CT scanners use one or all  of the following:  Automated exposure control, adjustment of the mA or kV  according to patient size, iterative reconstruction. FINDINGS: Brain volume is appropriate for age. No acute infarct, hemorrhage or  mass is identified. There is no mass effect, midline shift or depressed  fracture. The visualized paranasal sinuses and mastoid air cells are clear,  except for mild mucosal thickening floor the right maxillary sinus. Impression  No acute process. US Results (most recent):  No results found for this or any previous visit.         Admission date (for inpatients): 4/26/2022   * No surgery date entered *  Procedure(s):  APPENDECTOMY LAPAROSCOPIC        ASSESSMENT/PLAN:  Problem List  Date Reviewed: 4/26/2022          Codes Class Noted    RLQ abdominal pain ICD-10-CM: R10.31  ICD-9-CM: 789.03  4/26/2022 Peritonitis ([facility] [address] ICD-10-CM: K65.9  ICD-9-CM: 567.9  4/26/2022        * (Principal) Acute appendicitis with rupture ICD-10-CM: K35.32  ICD-9-CM: 540.0  4/26/2022        Elevated troponin ICD-10-CM: R77.8  ICD-9-CM: 790.6  4/26/2022        Leukocytosis ICD-10-CM: A97.274  ICD-9-CM: 288.60  4/26/2022            Principal Problem:    Acute appendicitis with rupture (4/26/2022)    Active Problems:    RLQ abdominal pain (4/26/2022)      Peritonitis ([address] (4/26/2022)      Elevated troponin (4/26/2022)      Leukocytosis (4/26/2022)           Number and Complexity of Problems addressed and   Risks of complications and/or morbidity of management      Acute ruptured appendicitis with leukocytosis and guarding  I discussed the patient's condition with the patient at length and treatment options. I discussed risks of surgery in language the patient could understand including bleeding, infection, need for further surgery, abscess, SBO, DVT, PE, heart attack, stroke, risks of anesthesia, etc.. Leoncio Keara Leoncio Keara The patient voiced understanding of all this and all questions were answered. Alternatives to surgery were discussed also and risks of the alternatives including progressive sepsis. The patient requested that we proceed with surgery. Informed consent was obtained. We decided to proceed with surgery emergently    OR notified        Elevated troponin. Cardiology already consulted and recommended not delaying surgery  Echocardiogram planned              Level of MDM (2/3 elements below)  Number and Complexity of Problems Addressed Amount and/or Complexity of Data to be Reviewed and Analyzed  *Each unique test, order, or document contributes to the combination of 2 or combination of 3 in Category 1 below.  Risk of Complications and/or Morbidity or Mortality of pt Management     42637  26129  Minimal  1self-limited or minor problem Minimal or none Minimal risk of morbidity from additional diagnostic testing or Rx   86952  21648 Low Low  2or more self-limited or minor problems;    or  1stable chronic illness;    or  [id number], uncomplicated illness or injury   Limited  (Must meet the requirements of at least 1 of the 2 categories)  Category 1: Tests and documents   Any combination of 2 from the following:  Review of prior external note(s) from each unique source*;  review of the result(s) of each unique test*;   ordering of each unique test*    or   Category 2: Assessment requiring an independent historian(s)  (For the categories of independent interpretation of tests and discussion of management or test interpretation, see moderate or high) Low risk of morbidity from additional diagnostic testing or treatment     31609  69959 Mod Moderate  1or more chronic illnesses with exacerbation, progression, or side effects of treatment;    or  2or more stable chronic illnesses;    or  1undiagnosed new problem with uncertain prognosis;    or  1acute illness with systemic symptoms;    or  9ZZRUC complicated injury   Moderate  (Must meet the requirements of at least 1 out of 3 categories)  Category 1: Tests, documents, or independent historian(s)  Any combination of 3 from the following:   Review of prior external note(s) from each unique source*;  Review of the result(s) of each unique test*;  Ordering of each unique test*;  Assessment requiring an independent historian(s)    or  Category 2: Independent interpretation of tests   Independent interpretation of a test performed by another physician/other qualified health care professional (not separately reported);     or  Category 3: Discussion of management or test interpretation  Discussion of management or test interpretation with external physician/other qualified health care professional/appropriate source (not separately reported)   Moderate risk of morbidity from additional diagnostic testing or treatment  Examples only:  Prescription drug management   Decision regarding minor surgery with identified patient or procedure risk factors  Decision regarding elective major surgery without identified patient or procedure risk factors   Diagnosis or treatment significantly limited by social determinants of health       21256  50073 High High  1or more chronic illnesses with severe exacerbation, progression, or side effects of treatment;    or  1 acute or chronic illness or injury that poses a threat to life or bodily function   Extensive  (Must meet the requirements of at least 2 out of 3 categories)  Category 1: Tests, documents, or independent historian(s)  Any combination of 3 from the following:   Review of prior external note(s) from each unique source*;  Review of the result(s) of each unique test*;   Ordering of each unique test*;   Assessment requiring an independent historian(s)    or   Category 2: Independent interpretation of tests   Independent interpretation of a test performed by another physician/other qualified health care professional (not separately reported);     or  Category 3: Discussion of management or test interpretation  Discussion of management or test interpretation with external physician/other qualified health care professional/appropriate source (not separately reported)   High risk of morbidity from additional diagnostic testing or treatment  Examples only:  Drug therapy requiring intensive monitoring for toxicity  Decision regarding elective major surgery with identified patient or procedure risk factors  Decision regarding emergency major surgery  Decision regarding hospitalization  Decision not to resuscitate or to de-escalate care because of poor prognosis             I have personally performed a face-to-face diagnostic evaluation and management  service on this patient. I have independently seen the patient. I have independently obtained the above history from the patient/family.     I have independently examined the patient with above findings. I have independently reviewed data/labs for this patient and developed the above plan of care (MDM). Signed: Sada Downs.  Neris Small MD, FACS

## 2022-04-26 NOTE — ED TRIAGE NOTES
Pt states that he was seen and treated on Saturday at Urgent care for upper mid abd pain. Rx pepcid and Zofran. Now, having right lower abd pain with a low grade temp.   Masked on arrival

## 2022-04-26 NOTE — PROGRESS NOTES
Problem: Falls - Risk of  Goal: *Absence of Falls  Description: Document Maryam Hamilton Fall Risk and appropriate interventions in the flowsheet.   Outcome: Progressing Towards Goal  Note: Fall Risk Interventions:            Medication Interventions: Patient to call before getting OOB                   Problem: Patient Education: Go to Patient Education Activity  Goal: Patient/Family Education  Outcome: Progressing Towards Goal

## 2022-04-26 NOTE — OP NOTES
[address] [facility]  [phone]    OPERATVE REPORT    Name: Ari Valladares     Date of Surgery: 4/26/2022  Med Record Number: [id number]   Age: 46 y.o. Sex: male   Pre-operative Diagnosis: ACUTE RUPTURED APPENDICITIS    Post-operative Diagnosis: same  Gangrenous appendicitis (photos obtained)    Procedure: Laparoscopic Appendectomy with drain placement (CPT 30608)  Surgeon: Gabrielle Rodríguez MD  Assistants: none  Anesthesia:  General  Complications: none  Specimens:  appendix to path  Estimated Blood Loss:  <30cc    OPERATIVE PROCEDURE: The risks, benefits, potential complications,  treatment options and expected outcomes were discussed with the patient  and/or patient family members preoperatively. The possibilities of  reactions to medications, chronic pain, bleeding, infection, abscess and  injury to internal organs including bowel, fistula, blood clots, hernia,  the need for further surgeries or procedures, open surgery, etc...were discussed and all the patient's and/or patient's family members  questions were answered. Understanding was voiced and informed consent  was obtained preoperatively. The patient was taken to the operating room  and [facility] time-out protocol check list was followed. After induction of general anesthesia, the abdomen was prepped and draped  in the usual fashion. The patient was opened through a subumbilical cut-down incision  and a Morena trocar was inserted under direct vision. After adequate  pneumoperitoneum, 5 mm and 12 mm trocars were placed in the usual  locations to help triangulate over the appendix. The appendix was ruptured, ischemic, gangrenous, and partially liquified. It was mobilized. Pus was evacuataed and sample sent to [facility] for culture. The mesoappendix was divided with a LigaSure device. This was carried  down to the base of the appendix which was also moderately ischemic.  The appendix was divided from its insertion in the cecum with a laparoscopic VEE blue stapler and the appendix was then  placed within an Endo Catch bag and retracted out through the umbilical trocar  site with the camera having been reinserted through the 12 mm trocar  site, which had been placed to the right of midline. After a large amount of  irrigation of the intraabdominal cavity and confirmation of adequate  hemostasis and inspection of the cecal stump to confirm that the clips  were intact with good closure, Marcaine was infiltrated into each  trocar site. The trocars were withdrawn without active bleeding. The  fascia at the umbilical level was closed with interrupted 0 Vicryl sutures. The  fascia at the 12 mm trocar site was closed with Vicryl suture. The skin  was closed with clips. Sterile dressings were placed. The patient was  taken to recovery in good condition.     Jerome Valenzuela MD, FACS

## 2022-04-26 NOTE — H&P
[address] Cardiology Evaluation                 Date of  Admission: 4/26/2022  3:10 AM     Primary Care Physician: none  Primary Cardiologist: none   Referring Physician: Dr Cj Ballesteros cardiologist:Dr. Magalis Taylor     Reason for cardiac evaluation: elevated troponin, abnormal EKG with ruptured appendix       Kanwal Balderrama is a 46 y.o. male     No prior cardiac history. Patient presented to ED at [facility] with c/o right lower quadrant pain with associated nausea, vomiting, subjective fever and chills ongoing intermittently since Friday. On presentation to the ED, work-up noted with WBC 18, HS trop [id number], EKG with early repolarization, Ct abd with acute appendicitis, with a few suspected tiny bubbles of extraluminal air raising the possibility of perforation. ED spoke with on-call cardiology regarding EKG and elevated troponin likely demand related and would not alter surgical care for urgent appendicitis. Patient underwent lap appendectomy with drain by Dr. Ranjan Tariq. Cardiology was consulted for elevated trop, abnormal EKG with ruptured appendix. Currently mostly with some postsurgical pain with movement. Denies any chest discomfort. States normally active at baseline with no symptoms. Echocardiogram from today with preserved EF and no noted wall motion abnormalities. Patient Active Problem List   Diagnosis Code    RLQ abdominal pain R10.31    Peritonitis ([address] K65.9    Elevated troponin R77.8    Leukocytosis D72.829    Ruptured appendicitis K35.32       History reviewed. No pertinent past medical history. History reviewed. No pertinent surgical history. No Known Allergies   History reviewed. No pertinent family history.    Social History     Socioeconomic History    Marital status: SINGLE     Spouse name: Not on file    Number of children: Not on file    Years of education: Not on file    Highest education level: Not on file   Occupational History    Not on file Tobacco Use    Smoking status: Never Smoker    Smokeless tobacco: Never Used   Substance and Sexual Activity    Alcohol use: Yes    Drug use: Not on file    Sexual activity: Yes     Partners: Female   Other Topics Concern    Not on file   Social History Narrative    Not on file     Social Determinants of Health     Financial Resource Strain:     Difficulty of Paying Living Expenses: Not on file   Food Insecurity:     Worried About Running Out of Food in the Last Year: Not on file    Aixa of Food in the Last Year: Not on file   Transportation Needs:     Lack of Transportation (Medical): Not on file    Lack of Transportation (Non-Medical):  Not on file   Physical Activity:     Days of Exercise per Week: Not on file    Minutes of Exercise per Session: Not on file   Stress:     Feeling of Stress : Not on file   Social Connections:     Frequency of Communication with Friends and Family: Not on file    Frequency of Social Gatherings with Friends and Family: Not on file    Attends [employer] Services: Not on file    Active Member of [address] [place] [address] or Organizations: Not on file    Attends Club or Organization Meetings: Not on file    Marital Status: Not on file   Intimate Partner Violence:     Fear of Current or Ex-Partner: Not on file    Emotionally Abused: Not on file    Physically Abused: Not on file    Sexually Abused: Not on file   Housing Stability:     Unable to Pay for Housing in the Last Year: Not on file    Number of [place] in the Last Year: Not on file    Unstable Housing in the Last Year: Not on file       Current Facility-Administered Medications   Medication Dose Route Frequency    sodium chloride (NS) flush 5-10 mL  5-10 mL IntraVENous Q8H    sodium chloride (NS) flush 5-10 mL  5-10 mL IntraVENous PRN    famotidine (PF) (PEPCID) 20 mg in 0.9% sodium chloride 10 mL injection  20 mg IntraVENous Q12H    piperacillin-tazobactam (ZOSYN) 3.375 g in 0.9% sodium chloride (MBP/ADV) 100 mL MBP  3.375 g IntraVENous Q8H    lactated Ringers infusion  150 mL/hr IntraVENous CONTINUOUS    lactated Ringers infusion  100 mL/hr IntraVENous CONTINUOUS    HYDROmorphone (DILAUDID) injection 0.5 mg  0.5 mg IntraVENous Multiple    prochlorperazine (COMPAZINE) injection 2.5 mg  2.5 mg IntraVENous Q10MIN PRN       Review of Systems   Constitutional: Positive for chills and fever. Negative for diaphoresis and malaise/fatigue. HENT: Negative for congestion. Cardiovascular: Negative for chest pain, claudication, cyanosis, dyspnea on exertion, irregular heartbeat, leg swelling, near-syncope, orthopnea, palpitations, paroxysmal nocturnal dyspnea and syncope. Respiratory: Negative for cough, shortness of breath and wheezing. Endocrine: Negative for cold intolerance and heat intolerance. Hematologic/Lymphatic: Does not bruise/bleed easily. Skin: Negative for nail changes. Gastrointestinal: Positive for abdominal pain, nausea and vomiting. Neurological: Negative for dizziness, headaches and weakness.         Physical Exam  Vitals:    04/26/22 0847 04/26/22 0850 04/26/22 0855 04/26/22 0900   BP:  114/65 106/62 111/63   Pulse:  77 72 72   Resp:  16 16 16   Temp:       SpO2: 90% 94% 96% 98%   Weight:       Height:           Physical Exam:  General: Well Developed, Well Nourished, No Acute Distress  HEENT: pupils equal and round, no abnormalities noted  Neck: supple, no JVD, no carotid bruits  Heart: S1S2 with RRR without murmurs or gallops  Lungs: Clear throughout auscultation bilaterally without adventitious sounds  Abd: soft, nontender, nondistended, with good bowel sounds  Ext: warm, no edema, calves supple/nontender, pulses 2+ bilaterally  Skin: warm and dry  Psychiatric: Normal mood and affect  Neurologic: Alert and oriented X 3    Cardiographics    Telemetry:   ECG: SR with early repolarization     Labs:   Recent Labs     04/26/22  0334      K 3.6   BUN 13   CREA 1.00   *   WBC 18.0* HGB 13.5*   HCT 41.2           Assessment/Plan:     Assessment:      Principal Problem:    Ruptured appendicitis (4/26/2022)  -Management per surgery. Status post laparoscopic appendectomy with drain placement. On IV abx per surgery. Active Problems:    RLQ abdominal pain (4/26/2022)      Peritonitis ([address] (4/26/2022)      Elevated troponin (4/26/2022)  -likely demand related in the setting of acute appendicitis possible rupture/peritonitis. Not consistent with ACS. -EKG reviewed with noted repolarization variant.  -Echocardiogram with preserved EF and no noted wall motion abnormalities.  -No further cardiac work-up warranted at this time. Leukocytosis (4/26/2022)    We appreciate the opportunity to participate in this patient's care. No further cardiac work-up needed at this time. Will sign off.   Please call if questions    Minal Ashford MD  4/26/2022  7:37 PM

## 2022-04-26 NOTE — PERIOP NOTES
TRANSFER - OUT REPORT:    Verbal report given to Freddy Sandhoff, RN on Daniel Raymundo  being transferred to  for routine post - op       Report consisted of patients Situation, Background, Assessment and   Recommendations(SBAR). Information from the following report(s) SBAR, OR Summary, Procedure Summary, Intake/Output and MAR was reviewed with the receiving nurse. Lines:   Peripheral IV 04/26/22 Right Antecubital (Active)   Site Assessment Clean, dry, & intact 04/26/22 0835   Phlebitis Assessment 0 04/26/22 0835   Infiltration Assessment 0 04/26/22 0835   Dressing Status Clean, dry, & intact 04/26/22 0835   Dressing Type Tape;Transparent 04/26/22 0835   Hub Color/Line Status Pink; Infusing;Flushed;Patent 04/26/22 0835       Peripheral IV 04/26/22 Left Forearm (Active)   Site Assessment Clean, dry, & intact 04/26/22 0835   Phlebitis Assessment 0 04/26/22 0835   Infiltration Assessment 0 04/26/22 0835   Dressing Status Clean, dry, & intact 04/26/22 0835   Dressing Type Tape;Transparent 04/26/22 0835   Hub Color/Line Status Blue;Flushed;Patent;Capped 04/26/22 4434        Opportunity for questions and clarification was provided.       Patient transported with:   O2 @ 2 liters   Tech  Chart  IV x 2

## 2022-04-27 PROBLEM — R65.20 SEPSIS WITH ACUTE ORGAN DYSFUNCTION (HCC): Status: ACTIVE | Noted: 2022-04-27

## 2022-04-27 PROBLEM — N99.89 POSTOPERATIVE URINARY RETENTION: Status: ACTIVE | Noted: 2022-04-27

## 2022-04-27 PROBLEM — I48.0 PAF (PAROXYSMAL ATRIAL FIBRILLATION) (HCC): Status: ACTIVE | Noted: 2022-04-27

## 2022-04-27 PROBLEM — R09.02 HYPOXIA: Status: ACTIVE | Noted: 2022-04-27

## 2022-04-27 PROBLEM — A41.9 SEPSIS WITH ACUTE ORGAN DYSFUNCTION (HCC): Status: ACTIVE | Noted: 2022-04-27

## 2022-04-27 PROBLEM — R33.8 POSTOPERATIVE URINARY RETENTION: Status: ACTIVE | Noted: 2022-04-27

## 2022-04-27 LAB
ANION GAP SERPL CALC-SCNC: 6 MMOL/L (ref 7–16)
APPEARANCE UR: CLEAR
BACTERIA URNS QL MICRO: 0 /HPF
BILIRUB UR QL: NEGATIVE
BUN SERPL-MCNC: 9 MG/DL (ref 6–23)
CALCIUM SERPL-MCNC: 8.3 MG/DL (ref 8.3–10.4)
CASTS URNS QL MICRO: NORMAL /LPF
CHLORIDE SERPL-SCNC: 104 MMOL/L (ref 98–107)
CO2 SERPL-SCNC: 28 MMOL/L (ref 21–32)
COLOR UR: YELLOW
CREAT SERPL-MCNC: 0.93 MG/DL (ref 0.8–1.5)
CRYSTALS URNS QL MICRO: 0 /LPF
EPI CELLS #/AREA URNS HPF: NORMAL /HPF
ERYTHROCYTE [DISTWIDTH] IN BLOOD BY AUTOMATED COUNT: 13.4 % (ref 11.9–14.6)
GLUCOSE SERPL-MCNC: 100 MG/DL (ref 65–100)
GLUCOSE UR STRIP.AUTO-MCNC: NEGATIVE MG/DL
HCT VFR BLD AUTO: 36.1 % (ref 41.1–50.3)
HGB BLD-MCNC: 11.5 G/DL (ref 13.6–17.2)
HGB UR QL STRIP: ABNORMAL
KETONES UR QL STRIP.AUTO: 40 MG/DL
LEUKOCYTE ESTERASE UR QL STRIP.AUTO: NEGATIVE
MCH RBC QN AUTO: 29.9 PG (ref 26.1–32.9)
MCHC RBC AUTO-ENTMCNC: 31.9 G/DL (ref 31.4–35)
MCV RBC AUTO: 93.8 FL (ref 79.6–97.8)
MUCOUS THREADS URNS QL MICRO: 0 /LPF
NITRITE UR QL STRIP.AUTO: NEGATIVE
NRBC # BLD: 0 K/UL (ref 0–0.2)
PH UR STRIP: 7.5 [PH] (ref 5–9)
PLATELET # BLD AUTO: 249 K/UL (ref 150–450)
PMV BLD AUTO: 10.8 FL (ref 9.4–12.3)
POTASSIUM SERPL-SCNC: 3.8 MMOL/L (ref 3.5–5.1)
PROCALCITONIN SERPL-MCNC: 0.3 NG/ML (ref 0–0.49)
PROT UR STRIP-MCNC: ABNORMAL MG/DL
RBC # BLD AUTO: 3.85 M/UL (ref 4.23–5.6)
RBC #/AREA URNS HPF: NORMAL /HPF
SODIUM SERPL-SCNC: 138 MMOL/L (ref 136–145)
SP GR UR REFRACTOMETRY: 1.01 (ref 1–1.02)
UROBILINOGEN UR QL STRIP.AUTO: 1 EU/DL (ref 0.2–1)
WBC # BLD AUTO: 11.9 K/UL (ref 4.3–11.1)
WBC URNS QL MICRO: NORMAL /HPF

## 2022-04-27 PROCEDURE — 81003 URINALYSIS AUTO W/O SCOPE: CPT

## 2022-04-27 PROCEDURE — 81015 MICROSCOPIC EXAM OF URINE: CPT

## 2022-04-27 PROCEDURE — 80048 BASIC METABOLIC PNL TOTAL CA: CPT

## 2022-04-27 PROCEDURE — 74011250637 HC RX REV CODE- 250/637: Performed by: INTERNAL MEDICINE

## 2022-04-27 PROCEDURE — 99024 POSTOP FOLLOW-UP VISIT: CPT | Performed by: SURGERY

## 2022-04-27 PROCEDURE — 96375 TX/PRO/DX INJ NEW DRUG ADDON: CPT

## 2022-04-27 PROCEDURE — 99215 OFFICE O/P EST HI 40 MIN: CPT | Performed by: INTERNAL MEDICINE

## 2022-04-27 PROCEDURE — 74011000258 HC RX REV CODE- 258: Performed by: SURGERY

## 2022-04-27 PROCEDURE — 74011000250 HC RX REV CODE- 250: Performed by: SURGERY

## 2022-04-27 PROCEDURE — G0378 HOSPITAL OBSERVATION PER HR: HCPCS

## 2022-04-27 PROCEDURE — 74011250636 HC RX REV CODE- 250/636: Performed by: NURSE PRACTITIONER

## 2022-04-27 PROCEDURE — 2709999900 HC NON-CHARGEABLE SUPPLY

## 2022-04-27 PROCEDURE — 74011250636 HC RX REV CODE- 250/636: Performed by: SURGERY

## 2022-04-27 PROCEDURE — 74011250637 HC RX REV CODE- 250/637: Performed by: SURGERY

## 2022-04-27 PROCEDURE — 74011250637 HC RX REV CODE- 250/637: Performed by: NURSE PRACTITIONER

## 2022-04-27 PROCEDURE — 36415 COLL VENOUS BLD VENIPUNCTURE: CPT

## 2022-04-27 PROCEDURE — 85027 COMPLETE CBC AUTOMATED: CPT

## 2022-04-27 PROCEDURE — 84145 PROCALCITONIN (PCT): CPT

## 2022-04-27 PROCEDURE — 87040 BLOOD CULTURE FOR BACTERIA: CPT

## 2022-04-27 RX ORDER — TAMSULOSIN HYDROCHLORIDE 0.4 MG/1
0.4 CAPSULE ORAL DAILY
Status: DISCONTINUED | OUTPATIENT
Start: 2022-04-27 | End: 2022-05-01 | Stop reason: HOSPADM

## 2022-04-27 RX ORDER — PHENAZOPYRIDINE HYDROCHLORIDE 95 MG/1
95 TABLET ORAL
Status: DISCONTINUED | OUTPATIENT
Start: 2022-04-27 | End: 2022-05-01 | Stop reason: HOSPADM

## 2022-04-27 RX ORDER — METOPROLOL SUCCINATE 25 MG/1
25 TABLET, EXTENDED RELEASE ORAL DAILY
Status: DISCONTINUED | OUTPATIENT
Start: 2022-04-27 | End: 2022-05-01 | Stop reason: HOSPADM

## 2022-04-27 RX ORDER — DEXTROSE, SODIUM CHLORIDE, AND POTASSIUM CHLORIDE 5; .45; .15 G/100ML; G/100ML; G/100ML
100 INJECTION INTRAVENOUS CONTINUOUS
Status: DISCONTINUED | OUTPATIENT
Start: 2022-04-27 | End: 2022-05-01

## 2022-04-27 RX ORDER — AMOXICILLIN 250 MG
1 CAPSULE ORAL
Status: DISCONTINUED | OUTPATIENT
Start: 2022-04-27 | End: 2022-05-01 | Stop reason: HOSPADM

## 2022-04-27 RX ADMIN — SODIUM CHLORIDE, PRESERVATIVE FREE 20 MG: 5 INJECTION INTRAVENOUS at 21:50

## 2022-04-27 RX ADMIN — TAMSULOSIN HYDROCHLORIDE 0.4 MG: 0.4 CAPSULE ORAL at 08:37

## 2022-04-27 RX ADMIN — URINARY PAIN RELIEF 95 MG: 95 TABLET ORAL at 21:50

## 2022-04-27 RX ADMIN — SODIUM CHLORIDE, SODIUM LACTATE, POTASSIUM CHLORIDE, AND CALCIUM CHLORIDE 150 ML/HR: 600; 310; 30; 20 INJECTION, SOLUTION INTRAVENOUS at 04:14

## 2022-04-27 RX ADMIN — DEXTROSE MONOHYDRATE, SODIUM CHLORIDE, AND POTASSIUM CHLORIDE 100 ML/HR: 50; 4.5; 1.49 INJECTION, SOLUTION INTRAVENOUS at 08:42

## 2022-04-27 RX ADMIN — METOPROLOL SUCCINATE 25 MG: 25 TABLET, EXTENDED RELEASE ORAL at 17:56

## 2022-04-27 RX ADMIN — SODIUM CHLORIDE, PRESERVATIVE FREE 10 ML: 5 INJECTION INTRAVENOUS at 21:50

## 2022-04-27 RX ADMIN — SODIUM CHLORIDE, PRESERVATIVE FREE 20 MG: 5 INJECTION INTRAVENOUS at 08:38

## 2022-04-27 RX ADMIN — SODIUM CHLORIDE 500 ML: 900 INJECTION, SOLUTION INTRAVENOUS at 12:56

## 2022-04-27 RX ADMIN — DEXTROSE MONOHYDRATE, SODIUM CHLORIDE, AND POTASSIUM CHLORIDE 100 ML/HR: 50; 4.5; 1.49 INJECTION, SOLUTION INTRAVENOUS at 18:01

## 2022-04-27 RX ADMIN — DOCUSATE SODIUM 50MG AND SENNOSIDES 8.6MG 1 TABLET: 8.6; 5 TABLET, FILM COATED ORAL at 20:07

## 2022-04-27 RX ADMIN — OXYCODONE HYDROCHLORIDE 10 MG: 5 TABLET ORAL at 20:04

## 2022-04-27 RX ADMIN — OXYCODONE HYDROCHLORIDE 10 MG: 5 TABLET ORAL at 11:04

## 2022-04-27 RX ADMIN — PIPERACILLIN AND TAZOBACTAM 3.38 G: 3; .375 INJECTION, POWDER, LYOPHILIZED, FOR SOLUTION INTRAVENOUS at 11:10

## 2022-04-27 RX ADMIN — SODIUM CHLORIDE, PRESERVATIVE FREE 10 ML: 5 INJECTION INTRAVENOUS at 04:13

## 2022-04-27 RX ADMIN — PIPERACILLIN AND TAZOBACTAM 3.38 G: 3; .375 INJECTION, POWDER, LYOPHILIZED, FOR SOLUTION INTRAVENOUS at 20:08

## 2022-04-27 RX ADMIN — PIPERACILLIN AND TAZOBACTAM 3.38 G: 3; .375 INJECTION, POWDER, LYOPHILIZED, FOR SOLUTION INTRAVENOUS at 04:12

## 2022-04-27 RX ADMIN — OXYCODONE HYDROCHLORIDE 10 MG: 5 TABLET ORAL at 23:41

## 2022-04-27 NOTE — PROGRESS NOTES
[place] removed per order. Patient NPO with IV fluids running. Urinal provided to monitor output. Patient educated to use urinal and let RN or PCT empty. Patient verbalized understanding.

## 2022-04-27 NOTE — PROGRESS NOTES
[facility], [place] CARDIOLOGY PROGRESS NOTE           4/27/2022 5:38 PM    Admit Date: 4/26/2022      Subjective:   -Developed short runs of rapid atrial fibrillation today-noticed on telemetry. No complaints of any chest pain or any worsening dyspnea overnight.  -He was aware of mild palpitations when he got up to walk after getting out of bed  ROS:  Cardiovascular:  As noted above    Objective:      Vitals:    04/27/22 1213 04/27/22 1215 04/27/22 1559 04/27/22 1643   BP: 105/77   102/69   Pulse: (!) 112 (!) 112 91 86   Resp: 18   17   Temp: 98 °F (36.7 °C)   98.7 °F (37.1 °C)   SpO2: 92%   93%   Weight:       Height:           Physical Exam:  General-No Acute Distress  Neck- supple, no JVD  CV- regular rate and rhythm no MRG  Lung- clear bilaterally  Abd- soft, mildly distended, tenderness present around the incision sites, bowel sounds present-sluggish  Ext- no edema bilaterally. Skin- warm and dry    Data Review:   Recent Labs     04/27/22  0425 04/26/22  0334    138   K 3.8 3.6   BUN 9 13   CREA 0.93 1.00    125*   WBC 11.9* 18.0*   HGB 11.5* 13.5*   HCT 36.1* 41.2    209       Assessment/Plan:     Principal Problem:    Sepsis with acute organ dysfunction ([facility] (4/27/2022)    Ruptured appendicitis (4/26/2022)  -Status post laparoscopic appendectomy with drain placement.  -Still has drain in place.  - on IV Zosyn. Active Problems:    PAF (paroxysmal atrial fibrillation) [address] (4/27/2022)  -Developed multiple short runs of rapid atrial fibrillation this afternoon. Noticed palpitations but denied any chest pain or lightheadedness or presyncope. No prior history of this in the past.  Echo from yesterday showed preserved LV function with normal atrial dimensions. Potassium levels mildly low at 3.8 and he is currently NPO.  -Magnesium level being checked.   TSH will be checked tomorrow morning       Elevated troponin (4/26/2022)  -Likely from demand ischemia in the setting of acute appendicitis. -EKG showed sinus rhythm with some Minor early repolarization changes involving the inferolateral leads.  -Check fasting lipid panel with tomorrow morning's labs. -Echo from this admission shows an EF at 55 to 60% with no regional wall motion abnormalities or pericardial effusion, normal atrial dimensions. RLQ abdominal pain (4/26/2022)  -Secondary to acute appendicitis.       Peritonitis ([address] (4/26/2022)  On antibiotics-      Leukocytosis (4/26/2022)  -Continue antibiotics          Rm Cote MD  4/27/2022 5:38 PM

## 2022-04-27 NOTE — PROGRESS NOTES
Hourly rounds completed this shift. Patient ambulated in room 2 times and OOB to chair. + flatus, no BM this shift.

## 2022-04-27 NOTE — PROGRESS NOTES
Care Management Interventions  PCP Verified by CM: No (pt does not currently have a PCP / information provided about area clinics )  Mode of Transport at Discharge: Self  Transition of Care Consult (CM Consult): Discharge Planning  Discharge Durable Medical Equipment: No  Physical Therapy Consult: No  Occupational Therapy Consult: No  Speech Therapy Consult: No  Support Systems: Spouse/Significant Other,Parent(s)  Confirm Follow Up Transport: Family  The Plan for Transition of Care is Related to the Following Treatment Goals : discharge home with family  The Patient and/or Patient Representative was Provided with a Choice of Provider and Agrees with the Discharge Plan?:  (n/a)  Name of the Patient Representative Who was Provided with a Choice of Provider and Agrees with the Discharge Plan:  (n/a)  Freedom of Choice List was Provided with Basic Dialogue that Supports the Patient's Individualized Plan of Care/Goals, Treatment Preferences and Shares the Quality Data Associated with the Providers?:  (n/a)  Discharge Location  Patient Expects to be Discharged to[de-identified] Home with family assistance     Medical record reviewed. CM met with patient and wife to introduce self and explain role in planning. Prior to admission, patient was living independently in his home with his wife. Pt is currently unemployed, however his wife works. [facility] representative met with patient and determined that he does not qualify for [place] Medicaid. Application for financial assistance was provided. CM provided information about area primary care clinics for uninsured patients. Wife confirmed that funds are available for prescriptions at discharge. Family will transport home. No discharge planning needs noted.

## 2022-04-27 NOTE — PROGRESS NOTES
Problem: Falls - Risk of  Goal: *Absence of Falls  Description: Document Diana Ruiz Fall Risk and appropriate interventions in the flowsheet.   Outcome: Progressing Towards Goal  Note: Fall Risk Interventions:       Medication Interventions: Patient to call before getting OOB         Problem: Patient Education: Go to Patient Education Activity  Goal: Patient/Family Education  Outcome: Progressing Towards Goal

## 2022-04-27 NOTE — PROGRESS NOTES
Problem: Falls - Risk of  Goal: *Absence of Falls  Description: Document Star Edmonds Fall Risk and appropriate interventions in the flowsheet.   Outcome: Progressing Towards Goal  Note: Fall Risk Interventions:            Medication Interventions: Patient to call before getting OOB                   Problem: Patient Education: Go to Patient Education Activity  Goal: Patient/Family Education  Outcome: Progressing Towards Goal     Problem: Patient Education: Go to Patient Education Activity  Goal: Patient/Family Education  Outcome: Progressing Towards Goal

## 2022-04-27 NOTE — PROGRESS NOTES
H&P/Consult Note/Progress Note/Office Note:   Pino Lira  MRN: [id number]  :1980  Age:41 y.o.    HPI: Pino Lira is a 46 y.o. male who came to the ER on 22 with a 5 day h/o progressive, constant, severe RLQ pain. He had associated fever but no N/V. No prior abd surgery  WBC 18k  CT as below  Gen Surgery was consulted. Troponin checked secondary to left arm pain and was elevated in ER  Pt denies chest pain  ER contacted [facility] and spoke with Dr. Phyllis Kessler. ER reported cardiology was not concerned about his heart and recommended echo but not to delay the care he needs in regards to his underlying appendicitis          22 CT abd/pelvis with IV contrast  - Liver: Within normal limits. - Gallbladder and bile ducts: Within normal limits. - Spleen: Within normal limits. - Urinary tract: Unremarkable except for a tiny left kidney stone. - Adrenals: Within normal limits. - Pancreas: Within normal limits. - Gastrointestinal tract: The appendix extends inferiorly from the cecum and  contains small calcified appendicoliths. There is thickened at 2 cm in diameter,  with surrounding inflammation and trace fluid. There are also a few suspected  bubbles of extraluminal air raising the possibility of perforation as well. No abscess or bowel obstruction is seen. The colon and small bowel loops are unremarkable. - Retroperitoneum: Within normal limits. - Peritoneal cavity and abdominal wall: Within normal limits. - Pelvis: Within normal limits. - Spine/bones: No acute process. - Other comments: The lung bases are clear.     IMPRESSION  Acute appendicitis, with a few suspected tiny bubbles of extraluminal air raising the possibility of perforation.                22 Laparoscopic Appendectomy with drain placement     22 POD1: Incisional pain with movement, most pain is related to chapman catheter that needed to be reinserted yesterday. Awake,alert. VSS T max 100.6. Abd firm. Abd Dressings CDI. -BS +Flatus. [place] with clear yellow 3450ml. Gayleen Coward 135ml  since placed. WBC 11.9 (18 pre op)                  History reviewed. No pertinent past medical history. History reviewed. No pertinent surgical history. Current Facility-Administered Medications   Medication Dose Route Frequency    dextrose 5% - 0.45% NaCl with KCl 20 mEq/L infusion  100 mL/hr IntraVENous CONTINUOUS    sodium chloride (NS) flush 5-10 mL  5-10 mL IntraVENous Q8H    sodium chloride (NS) flush 5-10 mL  5-10 mL IntraVENous PRN    famotidine (PF) (PEPCID) 20 mg in 0.9% sodium chloride 10 mL injection  20 mg IntraVENous Q12H    piperacillin-tazobactam (ZOSYN) 3.375 g in 0.9% sodium chloride (MBP/ADV) 100 mL MBP  3.375 g IntraVENous Q8H    morphine injection 2-6 mg  2-6 mg IntraVENous Q1H PRN    oxyCODONE IR (ROXICODONE) tablet 5-10 mg  5-10 mg Oral Q4H PRN    acetaminophen (TYLENOL) tablet 1,000 mg  1,000 mg Oral Q6H PRN     Patient has no known allergies. Social History     Socioeconomic History    Marital status: SINGLE   Tobacco Use    Smoking status: Never Smoker    Smokeless tobacco: Never Used   Substance and Sexual Activity    Alcohol use: Yes    Sexual activity: Yes     Partners: Female     Social History     Tobacco Use   Smoking Status Never Smoker   Smokeless Tobacco Never Used     History reviewed. No pertinent family history. ROS: The patient has no difficulty with chest pain or shortness of breath. No fever or chills. Comprehensive review of systems was otherwise unremarkable except as noted above.     Physical Exam:   Visit Vitals  /71 (BP 1 Location: Left upper arm, BP Patient Position: Sitting)   Pulse 98   Temp 99.6 °F (37.6 °C)   Resp 18   Ht 5' 7\" (1.702 m)   Wt 167 lb (75.8 kg)   SpO2 91%   BMI 26.16 kg/m²     Vitals:    04/27/22 0000 04/27/22 0400 04/27/22 0428 04/27/22 0714   BP:   117/73 115/71   Pulse: 89 78 78 98   Resp:   18 18 Temp:   97.8 °F (36.6 °C) 99.6 °F (37.6 °C)   SpO2:   96% 91%   Weight:       Height:         04/27 0701 - 04/27 1900  In: -   Out: 1100 [Urine:1100]  04/25 1901 - 04/27 0700  In: 2497.5 [I.V.:2497.5]  Out: 1212 [Urine:3450; Drains:135]    Constitutional: Alert, oriented, cooperative patient in no acute distress; appears stated age    Eyes:Sclera are clear. EOMs intact  ENMT: no external lesions gross hearing normal; no obvious neck masses, no ear or lip lesions, nares normal  CV: RRR. Normal perfusion  Resp: No JVD. Breathing is  non-labored; no audible wheezing. GI: moderately distended. BS-. Trochar dressings CDI w tegaderm. [place] with serosang. : chapman in place    Musculoskeletal: unremarkable with normal function. No embolic signs or cyanosis.    Neuro:  Oriented; moves all 4; no focal deficits  Psychiatric: normal affect and mood, no memory impairment    Recent vitals (if inpt):  Patient Vitals for the past 24 hrs:   BP Temp Pulse Resp SpO2   04/27/22 0714 115/71 99.6 °F (37.6 °C) 98 18 91 %   04/27/22 0428 117/73 97.8 °F (36.6 °C) 78 18 96 %   04/27/22 0400   78     04/27/22 0000   89     04/26/22 2310 109/68 98.9 °F (37.2 °C) 89 20 96 %   04/26/22 2000   78     04/26/22 1943 104/67 98.5 °F (36.9 °C) 78 30 97 %   04/26/22 1538 102/69 98.9 °F (37.2 °C) 95 18 94 %   04/26/22 0924 112/66 97.7 °F (36.5 °C) 76 16 97 %   04/26/22 0910 108/64  70 16 97 %   04/26/22 0900 111/63  72 16 98 %   04/26/22 0855 106/62  72 16 96 %   04/26/22 0850 114/65  77 16 94 %   04/26/22 0847     90 %   04/26/22 0845 113/65  73 16 98 %   04/26/22 0840 116/61  76 16 99 %   04/26/22 0835 115/65  78 16 97 %   04/26/22 0830 (!) 106/59  77 16 97 %   04/26/22 0825 (!) 108/59  79 16 97 %   04/26/22 0820 (!) 112/55  79 16 96 %       Amount and/or Complexity of Data Reviewed and Analyzed:  I reviewed and analyzed all of the unique labs and radiologic studies that are shown below as well as any that are in the HPI, and any that are in the expanded problem list below  *Each unique test, order, or document contributes to the combination of 2 or combination of 3 in Category 1 below. For this visit I also reviewed old records and prior notes. Recent Labs     04/27/22 0425 04/26/22  0334 04/26/22  0334   WBC 11.9*   < > 18.0*   HGB 11.5*   < > 13.5*      < > 209      < > 138   K 3.8   < > 3.6      < > 104   CO2 28   < > 27   BUN 9   < > 13   CREA 0.93   < > 1.00      < > 125*   TBILI  --   --  0.5   ALT  --   --  68*   AP  --   --  120   LPSE  --   --  300    < > = values in this interval not displayed. Review of most recent CBC  Lab Results   Component Value Date/Time    WBC 11.9 (H) 04/27/2022 04:25 AM    HGB 11.5 (L) 04/27/2022 04:25 AM    HCT 36.1 (L) 04/27/2022 04:25 AM    PLATELET 607 32/84/1247 04:25 AM    MCV 93.8 04/27/2022 04:25 AM       Review of most recent BMP  Lab Results   Component Value Date/Time    Sodium 138 04/27/2022 04:25 AM    Potassium 3.8 04/27/2022 04:25 AM    Chloride 104 04/27/2022 04:25 AM    CO2 28 04/27/2022 04:25 AM    Anion gap 6 (L) 04/27/2022 04:25 AM    Glucose 100 04/27/2022 04:25 AM    BUN 9 04/27/2022 04:25 AM    Creatinine 0.93 04/27/2022 04:25 AM    GFR est AA >60 04/27/2022 04:25 AM    GFR est non-AA >60 04/27/2022 04:25 AM    Calcium 8.3 04/27/2022 04:25 AM       Review of most recent LFTs (and lipase if done)  Lab Results   Component Value Date/Time    ALT (SGPT) 68 (H) 04/26/2022 03:34 AM    AST (SGOT) 65 (H) 04/26/2022 03:34 AM    Alk.  phosphatase 120 04/26/2022 03:34 AM    Bilirubin, total 0.5 04/26/2022 03:34 AM     Lab Results   Component Value Date/Time    Lipase 300 04/26/2022 03:34 AM       No results found for: INR, APTT, CBIL, LCAD, NH4, TROPT, TROIQ, INREXT, INREXT    Review of most recent HgbA1c  No results found for: HBA1C, WDU1UYMD, GEN0AANK, TBS7DOFW    Nutritional assessment screen for wound healing issues:  Lab Results Component Value Date/Time    Protein, total 7.5 04/26/2022 03:34 AM    Albumin 3.1 (L) 04/26/2022 03:34 AM       @lastcovr@  XR Results (most recent):  No results found for this or any previous visit. CT Results (most recent):  Results from Hospital Encounter encounter on 04/26/22    CT HEAD WO CONT    Narrative  EXAM: Noncontrast CT head. INDICATION: Headache and arm numbness. COMPARISON: None. TECHNIQUE: Noncontrast CT images of the head were obtained. Radiation dose  reduction techniques were used for this study. Our CT scanners use one or all  of the following:  Automated exposure control, adjustment of the mA or kV  according to patient size, iterative reconstruction. FINDINGS: Brain volume is appropriate for age. No acute infarct, hemorrhage or  mass is identified. There is no mass effect, midline shift or depressed  fracture. The visualized paranasal sinuses and mastoid air cells are clear,  except for mild mucosal thickening floor the right maxillary sinus. Impression  No acute process. US Results (most recent):  No results found for this or any previous visit.         Admission date (for inpatients): 4/26/2022   * No surgery date entered *  Procedure(s):  APPENDECTOMY LAPAROSCOPIC        ASSESSMENT/PLAN:  Problem List  Date Reviewed: 4/27/2022          Codes Class Noted    * (Principal) Sepsis with acute organ dysfunction ([facility] ICD-10-CM: A41.9, R65.20  ICD-9-CM: 038.9, 995.92  4/27/2022        Hypoxia ICD-10-CM: R09.02  ICD-9-CM: 799.02  4/27/2022        RLQ abdominal pain ICD-10-CM: R10.31  ICD-9-CM: 789.03  4/26/2022        Peritonitis ([facility] ICD-10-CM: K65.9  ICD-9-CM: 567.9  4/26/2022        Elevated troponin ICD-10-CM: R77.8  ICD-9-CM: 790.6  4/26/2022        Leukocytosis ICD-10-CM: N40.812  ICD-9-CM: 288.60  4/26/2022        Ruptured appendicitis ICD-10-CM: K35.32  ICD-9-CM: 540.0  4/26/2022    Overview Signed 4/26/2022  7:05 AM by Will Browne MD     4/26/22 s/p lap appendectomy; Dr Iron Sanchez                 Principal Problem:    Sepsis with acute organ dysfunction ([facility] (4/27/2022)    Active Problems:    RLQ abdominal pain (4/26/2022)      Peritonitis ([facility] (4/26/2022)      Elevated troponin (4/26/2022)      Leukocytosis (4/26/2022)      Ruptured appendicitis (4/26/2022)      Overview: 4/26/22 s/p lap appendectomy; Dr Iron Sanchez      Hypoxia (4/27/2022)           Number and Complexity of Problems addressed and   Risks of complications and/or morbidity of management      Acute ruptured appendicitis with leukocytosis and guarding on admission  He is s/p Laparoscopic Appendectomy with drain placement on 4/26/22. Continue NPO, IVF's, IV Abx   Leukocytosis improving- follow labs  Monitor Electrolytes- replace prn  Monitor pain  Monitor for fever  Await return of bowel function  Follow up surgical cultures  BC- no growth 21 hrs, final pending; repeat BC today ordered  Urine Culture - pending      Post op Urinary retention  Chapman reinserted yesterday   Start Flomax and remove chapman later today      afib on monitor  Reconsult cardiology      Elevated troponin. Cardiology consulted preoperatively -likely demand related in the setting of acute appendicitis possible rupture/peritonitis. Not consistent with ACS. No further w/u warranted at this time. Echocardiogram: preserved EF and no noted wall motion abnormalities              Level of MDM (2/3 elements below)  Number and Complexity of Problems Addressed Amount and/or Complexity of Data to be Reviewed and Analyzed  *Each unique test, order, or document contributes to the combination of 2 or combination of 3 in Category 1 below.  Risk of Complications and/or Morbidity or Mortality of pt Management     00900  22821 SF Minimal  1self-limited or minor problem Minimal or none Minimal risk of morbidity from additional diagnostic testing or Rx   61616  94492 Low Low  2or more self-limited or minor problems;    or  1stable chronic illness;    or  9VMRIP, uncomplicated illness or injury   Limited  (Must meet the requirements of at least 1 of the 2 categories)  Category 1: Tests and documents   Any combination of 2 from the following:  Review of prior external note(s) from each unique source*;  review of the result(s) of each unique test*;   ordering of each unique test*    or   Category 2: Assessment requiring an independent historian(s)  (For the categories of independent interpretation of tests and discussion of management or test interpretation, see moderate or high) Low risk of morbidity from additional diagnostic testing or treatment     74086  19540 Mod Moderate  1or more chronic illnesses with exacerbation, progression, or side effects of treatment;    or  2or more stable chronic illnesses;    or  1undiagnosed new problem with uncertain prognosis;    or  1acute illness with systemic symptoms;    or  8PLGOI complicated injury   Moderate  (Must meet the requirements of at least 1 out of 3 categories)  Category 1: Tests, documents, or independent historian(s)  Any combination of 3 from the following:   Review of prior external note(s) from each unique source*;  Review of the result(s) of each unique test*;  Ordering of each unique test*;  Assessment requiring an independent historian(s)    or  Category 2: Independent interpretation of tests   Independent interpretation of a test performed by another physician/other qualified health care professional (not separately reported);     or  Category 3: Discussion of management or test interpretation  Discussion of management or test interpretation with external physician/other qualified health care professional/appropriate source (not separately reported)   Moderate risk of morbidity from additional diagnostic testing or treatment  Examples only:  Prescription drug management   Decision regarding minor surgery with identified patient or procedure risk factors  Decision regarding elective major surgery without identified patient or procedure risk factors   Diagnosis or treatment significantly limited by social determinants of health       22010  24719 High High  1or more chronic illnesses with severe exacerbation, progression, or side effects of treatment;    or  1 acute or chronic illness or injury that poses a threat to life or bodily function   Extensive  (Must meet the requirements of at least 2 out of 3 categories)  Category 1: Tests, documents, or independent historian(s)  Any combination of 3 from the following:   Review of prior external note(s) from each unique source*;  Review of the result(s) of each unique test*;   Ordering of each unique test*;   Assessment requiring an independent historian(s)    or   Category 2: Independent interpretation of tests   Independent interpretation of a test performed by another physician/other qualified health care professional (not separately reported);     or  Category 3: Discussion of management or test interpretation  Discussion of management or test interpretation with external physician/other qualified health care professional/appropriate source (not separately reported)   High risk of morbidity from additional diagnostic testing or treatment  Examples only:  Drug therapy requiring intensive monitoring for toxicity  Decision regarding elective major surgery with identified patient or procedure risk factors  Decision regarding emergency major surgery  Decision regarding hospitalization  Decision not to resuscitate or to de-escalate care because of poor prognosis             I have personally performed a face-to-face diagnostic evaluation and management  service on this patient. I have independently seen the patient. I have independently obtained the above history from the patient/family. I have independently examined the patient with above findings.   I have independently reviewed data/labs for this patient and developed the above plan of care (MDM).   Signed: Viridiana Dunlap NP

## 2022-04-28 LAB
ANION GAP SERPL CALC-SCNC: 6 MMOL/L (ref 7–16)
BUN SERPL-MCNC: 9 MG/DL (ref 6–23)
CALCIUM SERPL-MCNC: 8.8 MG/DL (ref 8.3–10.4)
CHLORIDE SERPL-SCNC: 106 MMOL/L (ref 98–107)
CHOLEST SERPL-MCNC: 165 MG/DL
CO2 SERPL-SCNC: 28 MMOL/L (ref 21–32)
CREAT SERPL-MCNC: 0.95 MG/DL (ref 0.8–1.5)
ERYTHROCYTE [DISTWIDTH] IN BLOOD BY AUTOMATED COUNT: 13.2 % (ref 11.9–14.6)
GLUCOSE SERPL-MCNC: 117 MG/DL (ref 65–100)
HCT VFR BLD AUTO: 35.1 % (ref 41.1–50.3)
HDLC SERPL-MCNC: 16 MG/DL (ref 40–60)
HDLC SERPL: 10.3 {RATIO}
HGB BLD-MCNC: 11.2 G/DL (ref 13.6–17.2)
LDLC SERPL CALC-MCNC: 112.2 MG/DL
MCH RBC QN AUTO: 29.6 PG (ref 26.1–32.9)
MCHC RBC AUTO-ENTMCNC: 31.9 G/DL (ref 31.4–35)
MCV RBC AUTO: 92.9 FL (ref 79.6–97.8)
NRBC # BLD: 0 K/UL (ref 0–0.2)
PLATELET # BLD AUTO: 276 K/UL (ref 150–450)
PMV BLD AUTO: 10.7 FL (ref 9.4–12.3)
POTASSIUM SERPL-SCNC: 4 MMOL/L (ref 3.5–5.1)
RBC # BLD AUTO: 3.78 M/UL (ref 4.23–5.6)
SODIUM SERPL-SCNC: 140 MMOL/L (ref 136–145)
TRIGL SERPL-MCNC: 184 MG/DL (ref 35–150)
TSH SERPL DL<=0.005 MIU/L-ACNC: 2.35 UIU/ML
VLDLC SERPL CALC-MCNC: 36.8 MG/DL (ref 6–23)
WBC # BLD AUTO: 8.9 K/UL (ref 4.3–11.1)

## 2022-04-28 PROCEDURE — 99232 SBSQ HOSP IP/OBS MODERATE 35: CPT | Performed by: INTERNAL MEDICINE

## 2022-04-28 PROCEDURE — 74011000250 HC RX REV CODE- 250: Performed by: SURGERY

## 2022-04-28 PROCEDURE — 80048 BASIC METABOLIC PNL TOTAL CA: CPT

## 2022-04-28 PROCEDURE — 2709999900 HC NON-CHARGEABLE SUPPLY

## 2022-04-28 PROCEDURE — 36415 COLL VENOUS BLD VENIPUNCTURE: CPT

## 2022-04-28 PROCEDURE — 74011250636 HC RX REV CODE- 250/636: Performed by: SURGERY

## 2022-04-28 PROCEDURE — 74011000258 HC RX REV CODE- 258: Performed by: SURGERY

## 2022-04-28 PROCEDURE — 80061 LIPID PANEL: CPT

## 2022-04-28 PROCEDURE — 74011250637 HC RX REV CODE- 250/637: Performed by: SURGERY

## 2022-04-28 PROCEDURE — 74011250637 HC RX REV CODE- 250/637: Performed by: INTERNAL MEDICINE

## 2022-04-28 PROCEDURE — 74011250637 HC RX REV CODE- 250/637: Performed by: NURSE PRACTITIONER

## 2022-04-28 PROCEDURE — G0378 HOSPITAL OBSERVATION PER HR: HCPCS

## 2022-04-28 PROCEDURE — 96376 TX/PRO/DX INJ SAME DRUG ADON: CPT

## 2022-04-28 PROCEDURE — 85027 COMPLETE CBC AUTOMATED: CPT

## 2022-04-28 PROCEDURE — 84443 ASSAY THYROID STIM HORMONE: CPT

## 2022-04-28 RX ADMIN — PIPERACILLIN AND TAZOBACTAM 3.38 G: 3; .375 INJECTION, POWDER, LYOPHILIZED, FOR SOLUTION INTRAVENOUS at 03:39

## 2022-04-28 RX ADMIN — SODIUM CHLORIDE, PRESERVATIVE FREE 20 MG: 5 INJECTION INTRAVENOUS at 08:35

## 2022-04-28 RX ADMIN — DEXTROSE MONOHYDRATE, SODIUM CHLORIDE, AND POTASSIUM CHLORIDE 100 ML/HR: 50; 4.5; 1.49 INJECTION, SOLUTION INTRAVENOUS at 03:39

## 2022-04-28 RX ADMIN — METOPROLOL SUCCINATE 25 MG: 25 TABLET, EXTENDED RELEASE ORAL at 08:35

## 2022-04-28 RX ADMIN — SODIUM CHLORIDE, PRESERVATIVE FREE 20 MG: 5 INJECTION INTRAVENOUS at 21:35

## 2022-04-28 RX ADMIN — TAMSULOSIN HYDROCHLORIDE 0.4 MG: 0.4 CAPSULE ORAL at 08:35

## 2022-04-28 RX ADMIN — OXYCODONE HYDROCHLORIDE 10 MG: 5 TABLET ORAL at 03:41

## 2022-04-28 RX ADMIN — SODIUM CHLORIDE, PRESERVATIVE FREE 5 ML: 5 INJECTION INTRAVENOUS at 21:36

## 2022-04-28 RX ADMIN — DOCUSATE SODIUM 50MG AND SENNOSIDES 8.6MG 1 TABLET: 8.6; 5 TABLET, FILM COATED ORAL at 21:35

## 2022-04-28 RX ADMIN — SODIUM CHLORIDE, PRESERVATIVE FREE 10 ML: 5 INJECTION INTRAVENOUS at 05:23

## 2022-04-28 RX ADMIN — PIPERACILLIN AND TAZOBACTAM 3.38 G: 3; .375 INJECTION, POWDER, LYOPHILIZED, FOR SOLUTION INTRAVENOUS at 21:35

## 2022-04-28 RX ADMIN — PIPERACILLIN AND TAZOBACTAM 3.38 G: 3; .375 INJECTION, POWDER, LYOPHILIZED, FOR SOLUTION INTRAVENOUS at 12:04

## 2022-04-28 RX ADMIN — DEXTROSE MONOHYDRATE, SODIUM CHLORIDE, AND POTASSIUM CHLORIDE 100 ML/HR: 50; 4.5; 1.49 INJECTION, SOLUTION INTRAVENOUS at 16:53

## 2022-04-28 NOTE — PROGRESS NOTES
Problem: Falls - Risk of  Goal: *Absence of Falls  Description: Document Veronica Whelan Fall Risk and appropriate interventions in the flowsheet.   Outcome: Progressing Towards Goal  Note: Fall Risk Interventions:            Medication Interventions: Patient to call before getting OOB,Teach patient to arise slowly                   Problem: Patient Education: Go to Patient Education Activity  Goal: Patient/Family Education  Outcome: Progressing Towards Goal     Problem: Pain  Goal: *Control of Pain  Outcome: Progressing Towards Goal     Problem: Patient Education: Go to Patient Education Activity  Goal: Patient/Family Education  Outcome: Progressing Towards Goal

## 2022-04-28 NOTE — PROGRESS NOTES
Problem: Falls - Risk of  Goal: *Absence of Falls  Description: Document Flip Moncada Fall Risk and appropriate interventions in the flowsheet.   Outcome: Progressing Towards Goal  Note: Fall Risk Interventions:            Medication Interventions: Teach patient to arise slowly                   Problem: Patient Education: Go to Patient Education Activity  Goal: Patient/Family Education  Outcome: Progressing Towards Goal

## 2022-04-28 NOTE — ROUTINE PROCESS
Bedside and Verbal report given to self by Tray Mckeon RN. Report included SBAR, Kardex, ED Summary, Procedure Summary, Intake and Output and Cardiac Rhythm.

## 2022-04-28 NOTE — PROGRESS NOTES
H&P/Consult Note/Progress Note/Office Note:   Melissa Jimenez  MRN: [id number]  :1980  Age:41 y.o.    HPI: Melissa Jimenez is a 46 y.o. male who came to the ER on 22 with a 5 day h/o progressive, constant, severe RLQ pain. He had associated fever but no N/V. No prior abd surgery  WBC 18k  CT as below  Gen Surgery was consulted. Troponin checked secondary to left arm pain and was elevated in ER  Pt denies chest pain  ER contacted [facility] and spoke with Dr. Loyda Potter. ER reported cardiology was not concerned about his heart and recommended echo but not to delay the care he needs in regards to his underlying appendicitis          22 CT abd/pelvis with IV contrast  - Liver: Within normal limits. - Gallbladder and bile ducts: Within normal limits. - Spleen: Within normal limits. - Urinary tract: Unremarkable except for a tiny left kidney stone. - Adrenals: Within normal limits. - Pancreas: Within normal limits. - Gastrointestinal tract: The appendix extends inferiorly from the cecum and  contains small calcified appendicoliths. There is thickened at 2 cm in diameter,  with surrounding inflammation and trace fluid. There are also a few suspected  bubbles of extraluminal air raising the possibility of perforation as well. No abscess or bowel obstruction is seen. The colon and small bowel loops are unremarkable. - Retroperitoneum: Within normal limits. - Peritoneal cavity and abdominal wall: Within normal limits. - Pelvis: Within normal limits. - Spine/bones: No acute process. - Other comments:  The lung bases are clear.     IMPRESSION  Acute appendicitis, with a few suspected tiny bubbles of extraluminal air raising the possibility of perforation.              Additional hx:    22 Laparoscopic Appendectomy with drain placement     22 POD1: Incisional pain with movement, most pain is related to chapman catheter that needed to be reinserted yesterday. Awake,alert. VSS T max 100.6. Abd firm. Abd Dressings CDI. -BS +Flatus. [place] with clear yellow 3450ml. Shahana Primes 135ml  since placed. WBC 11.9 (18 pre op)    4/28/22 POD2 AF/VSS; good urine output; +flatus;  Pankaj drain serosang; has RLQ pain and abd still distended; NPO                    History reviewed. No pertinent past medical history. History reviewed. No pertinent surgical history. Current Facility-Administered Medications   Medication Dose Route Frequency    dextrose 5% - 0.45% NaCl with KCl 20 mEq/L infusion  100 mL/hr IntraVENous CONTINUOUS    tamsulosin (FLOMAX) capsule 0.4 mg  0.4 mg Oral DAILY    metoprolol succinate (TOPROL-XL) XL tablet 25 mg  25 mg Oral DAILY    phenazopyridine (PYRIDIUM) tab 95 mg  95 mg Oral TID PRN    senna-docusate (PERICOLACE) 8.6-50 mg per tablet 1 Tablet  1 Tablet Oral QHS    sodium chloride (NS) flush 5-10 mL  5-10 mL IntraVENous Q8H    sodium chloride (NS) flush 5-10 mL  5-10 mL IntraVENous PRN    famotidine (PF) (PEPCID) 20 mg in 0.9% sodium chloride 10 mL injection  20 mg IntraVENous Q12H    piperacillin-tazobactam (ZOSYN) 3.375 g in 0.9% sodium chloride (MBP/ADV) 100 mL MBP  3.375 g IntraVENous Q8H    morphine injection 2-6 mg  2-6 mg IntraVENous Q1H PRN    oxyCODONE IR (ROXICODONE) tablet 5-10 mg  5-10 mg Oral Q4H PRN    acetaminophen (TYLENOL) tablet 1,000 mg  1,000 mg Oral Q6H PRN     Patient has no known allergies. Social History     Socioeconomic History    Marital status: SINGLE   Tobacco Use    Smoking status: Never Smoker    Smokeless tobacco: Never Used   Substance and Sexual Activity    Alcohol use: Yes    Sexual activity: Yes     Partners: Female     Social History     Tobacco Use   Smoking Status Never Smoker   Smokeless Tobacco Never Used     History reviewed. No pertinent family history. ROS: The patient has no difficulty with chest pain or shortness of breath. No fever or chills.   Comprehensive review of systems was otherwise unremarkable except as noted above. Physical Exam:   Visit Vitals  /68 (BP 1 Location: Left upper arm, BP Patient Position: Supine)   Pulse 76   Temp 98 °F (36.7 °C)   Resp 16   Ht 5' 7\" (1.702 m)   Wt 167 lb (75.8 kg)   SpO2 96%   BMI 26.16 kg/m²     Vitals:    04/27/22 1643 04/27/22 1944 04/27/22 2300 04/28/22 0414   BP: 102/69 101/67 109/66 106/68   Pulse: 86 82 84 76   Resp: 17 16 16 16   Temp: 98.7 °F (37.1 °C) 97.6 °F (36.4 °C) 97.8 °F (36.6 °C) 98 °F (36.7 °C)   SpO2: 93% 95% 98% 96%   Weight:       Height:         No intake/output data recorded. 04/26 1901 - 04/28 0700  In: 1047.5 [I.V.:1047.5]  Out: 80 [Urine:3900; Drains:130]    Constitutional: Alert, oriented, cooperative patient in no acute distress; appears stated age    Eyes:Sclera are clear. EOMs intact  ENMT: no external lesions gross hearing normal; no obvious neck masses, no ear or lip lesions, nares normal  CV: RRR. Normal perfusion  Resp: No JVD. Breathing is  non-labored; no audible wheezing. GI: moderately distended. BS-. Trochar sites OK;  Pankaj drain serosang. Musculoskeletal: unremarkable with normal function. No embolic signs or cyanosis.    Neuro:  Oriented; moves all 4; no focal deficits  Psychiatric: normal affect and mood, no memory impairment    Recent vitals (if inpt):  Patient Vitals for the past 24 hrs:   BP Temp Pulse Resp SpO2   04/28/22 0414 106/68 98 °F (36.7 °C) 76 16 96 %   04/27/22 2300 109/66 97.8 °F (36.6 °C) 84 16 98 %   04/27/22 1944 101/67 97.6 °F (36.4 °C) 82 16 95 %   04/27/22 1643 102/69 98.7 °F (37.1 °C) 86 17 93 %   04/27/22 1559   91     04/27/22 1215   (!) 112     04/27/22 1213 105/77 98 °F (36.7 °C) (!) 112 18 92 %   04/27/22 1124 111/76 99.7 °F (37.6 °C) (!) 115 17 (!) 89 %   04/27/22 0800   98     04/27/22 0714 115/71 99.6 °F (37.6 °C) 98 18 91 %       Amount and/or Complexity of Data Reviewed and Analyzed:  I reviewed and analyzed all of the unique labs and radiologic studies that are shown below as well as any that are in the HPI, and any that are in the expanded problem list below  *Each unique test, order, or document contributes to the combination of 2 or combination of 3 in Category 1 below. For this visit I also reviewed old records and prior notes. Recent Labs     04/28/22  0611 04/27/22  0425 04/27/22  0425 04/26/22  0334 04/26/22  0334   WBC 8.9   < > 11.9*   < > 18.0*   HGB 11.2*   < > 11.5*   < > 13.5*      < > 249   < > 209   NA  --   --  138   < > 138   K  --   --  3.8   < > 3.6   CL  --   --  104   < > 104   CO2  --   --  28   < > 27   BUN  --   --  9   < > 13   CREA  --   --  0.93   < > 1.00   GLU  --   --  100   < > 125*   TBILI  --   --   --   --  0.5   ALT  --   --   --   --  68*   AP  --   --   --   --  120   LPSE  --   --   --   --  300    < > = values in this interval not displayed. Review of most recent CBC  Lab Results   Component Value Date/Time    WBC 8.9 04/28/2022 06:11 AM    HGB 11.2 (L) 04/28/2022 06:11 AM    HCT 35.1 (L) 04/28/2022 06:11 AM    PLATELET 241 35/12/5092 06:11 AM    MCV 92.9 04/28/2022 06:11 AM       Review of most recent BMP  Lab Results   Component Value Date/Time    Sodium 138 04/27/2022 04:25 AM    Potassium 3.8 04/27/2022 04:25 AM    Chloride 104 04/27/2022 04:25 AM    CO2 28 04/27/2022 04:25 AM    Anion gap 6 (L) 04/27/2022 04:25 AM    Glucose 100 04/27/2022 04:25 AM    BUN 9 04/27/2022 04:25 AM    Creatinine 0.93 04/27/2022 04:25 AM    GFR est AA >60 04/27/2022 04:25 AM    GFR est non-AA >60 04/27/2022 04:25 AM    Calcium 8.3 04/27/2022 04:25 AM       Review of most recent LFTs (and lipase if done)  Lab Results   Component Value Date/Time    ALT (SGPT) 68 (H) 04/26/2022 03:34 AM    AST (SGOT) 65 (H) 04/26/2022 03:34 AM    Alk.  phosphatase 120 04/26/2022 03:34 AM    Bilirubin, total 0.5 04/26/2022 03:34 AM     Lab Results   Component Value Date/Time    Lipase 300 04/26/2022 03:34 AM       No results found for: INR, APTT, CBIL, LCAD, NH4, TROPT, TROIQ, INREXT, INREXT    Review of most recent HgbA1c  No results found for: HBA1C, PWD3ZCXM, QJV2ERFH, AUF7MFDX    Nutritional assessment screen for wound healing issues:  Lab Results   Component Value Date/Time    Protein, total 7.5 04/26/2022 03:34 AM    Albumin 3.1 (L) 04/26/2022 03:34 AM       @lastcovr@  XR Results (most recent):  No results found for this or any previous visit. CT Results (most recent):  Results from Hospital Encounter encounter on 04/26/22    CT HEAD WO CONT    Narrative  EXAM: Noncontrast CT head. INDICATION: Headache and arm numbness. COMPARISON: None. TECHNIQUE: Noncontrast CT images of the head were obtained. Radiation dose  reduction techniques were used for this study. Our CT scanners use one or all  of the following:  Automated exposure control, adjustment of the mA or kV  according to patient size, iterative reconstruction. FINDINGS: Brain volume is appropriate for age. No acute infarct, hemorrhage or  mass is identified. There is no mass effect, midline shift or depressed  fracture. The visualized paranasal sinuses and mastoid air cells are clear,  except for mild mucosal thickening floor the right maxillary sinus. Impression  No acute process. US Results (most recent):  No results found for this or any previous visit.         Admission date (for inpatients): 4/26/2022   * No surgery date entered *  Procedure(s):  APPENDECTOMY LAPAROSCOPIC        ASSESSMENT/PLAN:  Problem List  Date Reviewed: 4/27/2022          Codes Class Noted    * (Principal) Sepsis with acute organ dysfunction ([facility] [address] ICD-10-CM: A41.9, R65.20  ICD-9-CM: 038.9, 995.92  4/27/2022        Hypoxia ICD-10-CM: R09.02  ICD-9-CM: 799.02  4/27/2022        Postoperative urinary retention ICD-10-CM: N99.89, R33.8  ICD-9-CM: 997.5  4/27/2022        PAF (paroxysmal atrial fibrillation) (HCC) ICD-10-CM: I48.0  ICD-9-CM: 427.31  4/27/2022        RLQ abdominal pain ICD-10-CM: R10.31  ICD-9-CM: 789.03  4/26/2022        Peritonitis ([facility] ICD-10-CM: K65.9  ICD-9-CM: 567.9  4/26/2022        Elevated troponin ICD-10-CM: R77.8  ICD-9-CM: 790.6  4/26/2022        Leukocytosis ICD-10-CM: U33.999  ICD-9-CM: 288.60  4/26/2022        Ruptured appendicitis ICD-10-CM: K35.32  ICD-9-CM: 540.0  4/26/2022    Overview Addendum 4/27/2022  3:28 PM by Sharan Mendoza MD     4/26/22 s/p lap appendectomy; Dr Brett Pierre  A:  \" APPENDIX\": ACUTE APPENDICITIS   Sign Out Date: 4/27/2022  KALIN Ponce M.D. Principal Problem:    Sepsis with acute organ dysfunction ([facility] (4/27/2022)    Active Problems:    RLQ abdominal pain (4/26/2022)      Peritonitis ([facility] (4/26/2022)      Elevated troponin (4/26/2022)      Leukocytosis (4/26/2022)      Ruptured appendicitis (4/26/2022)      Overview: 4/26/22 s/p lap appendectomy; Dr Brett Pierre      A:  \" APPENDIX\": ACUTE APPENDICITIS       Sign Out Date: 4/27/2022  KALIN Ponce M.D. Hypoxia (4/27/2022)      Postoperative urinary retention (4/27/2022)      PAF (paroxysmal atrial fibrillation) ([facility] (4/27/2022)           Number and Complexity of Problems addressed and   Risks of complications and/or morbidity of management      Acute ruptured appendicitis with leukocytosis and guarding on admission  He is s/p Laparoscopic Appendectomy with drain placement on 4/26/22. POD2  Still distended but passing flatus  Continue NPO, IVF's, IV Abx   Leukocytosis resolved on POD2    Monitor Electrolytes- replace prn  Monitor pain  Monitor for fever  Follow up surgical cultures  BC- no growth 21 hrs, final pending; repeat BC today ordered  Urine Culture - pending      Post op Urinary retention  Chapman reinserted and now removed  He is voiding without chapman  flomax      afib on monitor  Cardiology following    Elevated troponin.   Cardiology consulted preoperatively -likely demand related in the setting of acute appendicitis possible rupture/peritonitis. Not consistent with ACS. No further w/u warranted at this time. Echocardiogram: preserved EF and no noted wall motion abnormalities              Level of MDM (2/3 elements below)  Number and Complexity of Problems Addressed Amount and/or Complexity of Data to be Reviewed and Analyzed  *Each unique test, order, or document contributes to the combination of 2 or combination of 3 in Category 1 below.  Risk of Complications and/or Morbidity or Mortality of pt Management     02954  18106 SF Minimal  1self-limited or minor problem Minimal or none Minimal risk of morbidity from additional diagnostic testing or Rx   65910  87691 Low Low  2or more self-limited or minor problems;    or  1stable chronic illness;    or  3TWKRV, uncomplicated illness or injury   Limited  (Must meet the requirements of at least 1 of the 2 categories)  Category 1: Tests and documents   Any combination of 2 from the following:  Review of prior external note(s) from each unique source*;  review of the result(s) of each unique test*;   ordering of each unique test*    or   Category 2: Assessment requiring an independent historian(s)  (For the categories of independent interpretation of tests and discussion of management or test interpretation, see moderate or high) Low risk of morbidity from additional diagnostic testing or treatment     79303  40596 Mod Moderate  1or more chronic illnesses with exacerbation, progression, or side effects of treatment;    or  2or more stable chronic illnesses;    or  1undiagnosed new problem with uncertain prognosis;    or  1acute illness with systemic symptoms;    or  3JGMLO complicated injury   Moderate  (Must meet the requirements of at least 1 out of 3 categories)  Category 1: Tests, documents, or independent historian(s)  Any combination of 3 from the following:   Review of prior external note(s) from each unique source*;  Review of the result(s) of each unique test*;  Ordering of each unique test*;  Assessment requiring an independent historian(s)    or  Category 2: Independent interpretation of tests   Independent interpretation of a test performed by another physician/other qualified health care professional (not separately reported);     or  Category 3: Discussion of management or test interpretation  Discussion of management or test interpretation with external physician/other qualified health care professional/appropriate source (not separately reported)   Moderate risk of morbidity from additional diagnostic testing or treatment  Examples only:  Prescription drug management   Decision regarding minor surgery with identified patient or procedure risk factors  Decision regarding elective major surgery without identified patient or procedure risk factors   Diagnosis or treatment significantly limited by social determinants of health       56052  77917 High High  1or more chronic illnesses with severe exacerbation, progression, or side effects of treatment;    or  1 acute or chronic illness or injury that poses a threat to life or bodily function   Extensive  (Must meet the requirements of at least 2 out of 3 categories)  Category 1: Tests, documents, or independent historian(s)  Any combination of 3 from the following:   Review of prior external note(s) from each unique source*;  Review of the result(s) of each unique test*;   Ordering of each unique test*;   Assessment requiring an independent historian(s)    or   Category 2: Independent interpretation of tests   Independent interpretation of a test performed by another physician/other qualified health care professional (not separately reported);     or  Category 3: Discussion of management or test interpretation  Discussion of management or test interpretation with external physician/other qualified health care professional/appropriate source (not separately reported)   High risk of morbidity from additional diagnostic testing or treatment  Examples only:  Drug therapy requiring intensive monitoring for toxicity  Decision regarding elective major surgery with identified patient or procedure risk factors  Decision regarding emergency major surgery  Decision regarding hospitalization  Decision not to resuscitate or to de-escalate care because of poor prognosis             I have personally performed a face-to-face diagnostic evaluation and management  service on this patient. I have independently seen the patient. I have independently obtained the above history from the patient/family. I have independently examined the patient with above findings. I have independently reviewed data/labs for this patient and developed the above plan of care (MDM).   Signed: Candace Cisneros MD

## 2022-04-28 NOTE — ROUTINE PROCESS
Bedside and Verbal report given to NAJMA Irvin by self. Report included SBAR, Kardex, ED summary, procedure summary, recent results and cardiac rhythm.

## 2022-04-29 LAB
ANION GAP SERPL CALC-SCNC: 4 MMOL/L (ref 7–16)
BUN SERPL-MCNC: 9 MG/DL (ref 6–23)
CALCIUM SERPL-MCNC: 9.1 MG/DL (ref 8.3–10.4)
CHLORIDE SERPL-SCNC: 105 MMOL/L (ref 98–107)
CO2 SERPL-SCNC: 28 MMOL/L (ref 21–32)
CREAT SERPL-MCNC: 0.92 MG/DL (ref 0.8–1.5)
ERYTHROCYTE [DISTWIDTH] IN BLOOD BY AUTOMATED COUNT: 13.2 % (ref 11.9–14.6)
GLUCOSE SERPL-MCNC: 116 MG/DL (ref 65–100)
HCT VFR BLD AUTO: 35.2 % (ref 41.1–50.3)
HGB BLD-MCNC: 11.3 G/DL (ref 13.6–17.2)
MAGNESIUM SERPL-MCNC: 2.3 MG/DL (ref 1.8–2.4)
MCH RBC QN AUTO: 29.5 PG (ref 26.1–32.9)
MCHC RBC AUTO-ENTMCNC: 32.1 G/DL (ref 31.4–35)
MCV RBC AUTO: 91.9 FL (ref 79.6–97.8)
NRBC # BLD: 0 K/UL (ref 0–0.2)
PLATELET # BLD AUTO: 245 K/UL (ref 150–450)
PMV BLD AUTO: 11 FL (ref 9.4–12.3)
POTASSIUM SERPL-SCNC: 3.9 MMOL/L (ref 3.5–5.1)
RBC # BLD AUTO: 3.83 M/UL (ref 4.23–5.6)
SODIUM SERPL-SCNC: 137 MMOL/L (ref 136–145)
WBC # BLD AUTO: 9.1 K/UL (ref 4.3–11.1)

## 2022-04-29 PROCEDURE — G0378 HOSPITAL OBSERVATION PER HR: HCPCS

## 2022-04-29 PROCEDURE — 36415 COLL VENOUS BLD VENIPUNCTURE: CPT

## 2022-04-29 PROCEDURE — 74011250637 HC RX REV CODE- 250/637: Performed by: NURSE PRACTITIONER

## 2022-04-29 PROCEDURE — 96376 TX/PRO/DX INJ SAME DRUG ADON: CPT

## 2022-04-29 PROCEDURE — 85027 COMPLETE CBC AUTOMATED: CPT

## 2022-04-29 PROCEDURE — 65270000029 HC RM PRIVATE

## 2022-04-29 PROCEDURE — 83735 ASSAY OF MAGNESIUM: CPT

## 2022-04-29 PROCEDURE — 74011250636 HC RX REV CODE- 250/636: Performed by: SURGERY

## 2022-04-29 PROCEDURE — 74011000250 HC RX REV CODE- 250: Performed by: SURGERY

## 2022-04-29 PROCEDURE — 74011250637 HC RX REV CODE- 250/637: Performed by: SURGERY

## 2022-04-29 PROCEDURE — 99024 POSTOP FOLLOW-UP VISIT: CPT | Performed by: SURGERY

## 2022-04-29 PROCEDURE — 80048 BASIC METABOLIC PNL TOTAL CA: CPT

## 2022-04-29 PROCEDURE — 74011250637 HC RX REV CODE- 250/637: Performed by: INTERNAL MEDICINE

## 2022-04-29 PROCEDURE — 74011000258 HC RX REV CODE- 258: Performed by: SURGERY

## 2022-04-29 RX ADMIN — SODIUM CHLORIDE, PRESERVATIVE FREE 10 ML: 5 INJECTION INTRAVENOUS at 06:00

## 2022-04-29 RX ADMIN — OXYCODONE HYDROCHLORIDE 10 MG: 5 TABLET ORAL at 00:25

## 2022-04-29 RX ADMIN — PIPERACILLIN AND TAZOBACTAM 4.5 G: 4; .5 INJECTION, POWDER, LYOPHILIZED, FOR SOLUTION INTRAVENOUS at 11:58

## 2022-04-29 RX ADMIN — PIPERACILLIN AND TAZOBACTAM 4.5 G: 4; .5 INJECTION, POWDER, LYOPHILIZED, FOR SOLUTION INTRAVENOUS at 20:49

## 2022-04-29 RX ADMIN — DEXTROSE MONOHYDRATE, SODIUM CHLORIDE, AND POTASSIUM CHLORIDE 100 ML/HR: 50; 4.5; 1.49 INJECTION, SOLUTION INTRAVENOUS at 02:50

## 2022-04-29 RX ADMIN — DOCUSATE SODIUM 50MG AND SENNOSIDES 8.6MG 1 TABLET: 8.6; 5 TABLET, FILM COATED ORAL at 21:54

## 2022-04-29 RX ADMIN — TAMSULOSIN HYDROCHLORIDE 0.4 MG: 0.4 CAPSULE ORAL at 08:47

## 2022-04-29 RX ADMIN — DEXTROSE MONOHYDRATE, SODIUM CHLORIDE, AND POTASSIUM CHLORIDE 100 ML/HR: 50; 4.5; 1.49 INJECTION, SOLUTION INTRAVENOUS at 12:08

## 2022-04-29 RX ADMIN — SODIUM CHLORIDE, PRESERVATIVE FREE 20 MG: 5 INJECTION INTRAVENOUS at 20:44

## 2022-04-29 RX ADMIN — SODIUM CHLORIDE, PRESERVATIVE FREE 20 MG: 5 INJECTION INTRAVENOUS at 08:47

## 2022-04-29 RX ADMIN — PIPERACILLIN AND TAZOBACTAM 3.38 G: 3; .375 INJECTION, POWDER, LYOPHILIZED, FOR SOLUTION INTRAVENOUS at 03:54

## 2022-04-29 RX ADMIN — DEXTROSE MONOHYDRATE, SODIUM CHLORIDE, AND POTASSIUM CHLORIDE 100 ML/HR: 50; 4.5; 1.49 INJECTION, SOLUTION INTRAVENOUS at 21:54

## 2022-04-29 RX ADMIN — METOPROLOL SUCCINATE 25 MG: 25 TABLET, EXTENDED RELEASE ORAL at 08:47

## 2022-04-29 NOTE — DISCHARGE INSTRUCTIONS
Dressings/Wound Care  Keep incisions covered with dry gauze and tape until follow-up  Try to keep incisions as dry as possible to lower risk of infection. Activity  No heavy lifting (>5lbs) for 6 weeks to reduce risk of developing a hernia in the incisions. No driving until you are off pain meds for 24hrs and have no pain with movements associated with driving. Pain prescription (Norco) electronically sent to your pharmacy      Follow-up with [facility] in next 2-3 weeks as they recommended for Holter monitor for your atrial fibrillation      Follow-up with Dr Kim March nurse practitioner Marcio Fuchs in approx 10  in the office on a Monday. See Dr India Gann as needed after that visit.   [address], [address]  (Call for an appt time unless one is already made for you by discharge nurse which is preferred -->[phone]-->option 1)    Diet  Soft Diet

## 2022-04-29 NOTE — PROGRESS NOTES
Problem: Falls - Risk of  Goal: *Absence of Falls  Description: Document Baljinder Alonso Fall Risk and appropriate interventions in the flowsheet.   Outcome: Progressing Towards Goal  Note: Fall Risk Interventions:            Medication Interventions: Patient to call before getting OOB                   Problem: Patient Education: Go to Patient Education Activity  Goal: Patient/Family Education  Outcome: Progressing Towards Goal     Problem: Pain  Goal: *Control of Pain  Outcome: Progressing Towards Goal     Problem: Patient Education: Go to Patient Education Activity  Goal: Patient/Family Education  Outcome: Progressing Towards Goal

## 2022-04-29 NOTE — PROGRESS NOTES
H&P/Consult Note/Progress Note/Office Note:   Stephane Cheng  MRN: [id number]  :1980  Age:41 y.o.    HPI: Stephane Cheng is a 46 y.o. male who is s/p lap appendectomy for ruptured appendictis with abscess drainage on 22. PATH  A:  \" APPENDIX\": ACUTE APPENDICITIS   Sign Out Date: 2022  KALIN Lopez M.D. Prior to surgery he came to the ER on 22 with a 5 day h/o progressive, constant, severe RLQ pain. He had associated fever but no N/V. No prior abd surgery  WBC 18k  CT as below  . Troponin checked secondary to left arm pain and was elevated in ER  Pt denied chest pain  [facility] Cardiology was consulted. ER reported cardiology was not concerned about his heart and recommended echo but not to delay the care he needs in regards to his underlying appendicitis          22 CT abd/pelvis with IV contrast  - Liver: Within normal limits. - Gallbladder and bile ducts: Within normal limits. - Spleen: Within normal limits. - Urinary tract: Unremarkable except for a tiny left kidney stone. - Adrenals: Within normal limits. - Pancreas: Within normal limits. - Gastrointestinal tract: The appendix extends inferiorly from the cecum and  contains small calcified appendicoliths. There is thickened at 2 cm in diameter,  with surrounding inflammation and trace fluid. There are also a few suspected  bubbles of extraluminal air raising the possibility of perforation as well. No abscess or bowel obstruction is seen. The colon and small bowel loops are unremarkable. - Retroperitoneum: Within normal limits. - Peritoneal cavity and abdominal wall: Within normal limits. - Pelvis: Within normal limits. - Spine/bones: No acute process. - Other comments:  The lung bases are clear.     IMPRESSION  Acute appendicitis, with a few suspected tiny bubbles of extraluminal air raising the possibility of perforation.              Additional hx:    22 Laparoscopic Appendectomy with drain placement     4/27/22 POD1: Incisional pain with movement, most pain is related to chapman catheter that needed to be reinserted yesterday. Awake,alert. VSS T max 100.6. Abd firm. Abd Dressings CDI. -BS +Flatus. Chapman with clear yellow 3450ml. Dyke Keith 135ml  since placed. WBC 11.9 (18 pre op)    4/28/22 POD2 AF/VSS; good urine output; +flatus;  Pankaj drain serosang; has RLQ pain and abd still distended; NPO    4/29/22 POD3; RLQ persists but improved; AF; WBC normal; tender in RLQ; drain is serous; peritoneal culture from OR with gm neg rods so far                    History reviewed. No pertinent past medical history. History reviewed. No pertinent surgical history. Current Facility-Administered Medications   Medication Dose Route Frequency    piperacillin-tazobactam (ZOSYN) 4.5 g in 0.9% sodium chloride (MBP/ADV) 100 mL MBP  4.5 g IntraVENous Q6H    dextrose 5% - 0.45% NaCl with KCl 20 mEq/L infusion  100 mL/hr IntraVENous CONTINUOUS    tamsulosin (FLOMAX) capsule 0.4 mg  0.4 mg Oral DAILY    metoprolol succinate (TOPROL-XL) XL tablet 25 mg  25 mg Oral DAILY    phenazopyridine (PYRIDIUM) tab 95 mg  95 mg Oral TID PRN    senna-docusate (PERICOLACE) 8.6-50 mg per tablet 1 Tablet  1 Tablet Oral QHS    sodium chloride (NS) flush 5-10 mL  5-10 mL IntraVENous Q8H    sodium chloride (NS) flush 5-10 mL  5-10 mL IntraVENous PRN    famotidine (PF) (PEPCID) 20 mg in 0.9% sodium chloride 10 mL injection  20 mg IntraVENous Q12H    morphine injection 2-6 mg  2-6 mg IntraVENous Q1H PRN    oxyCODONE IR (ROXICODONE) tablet 5-10 mg  5-10 mg Oral Q4H PRN    acetaminophen (TYLENOL) tablet 1,000 mg  1,000 mg Oral Q6H PRN     Patient has no known allergies. Social History     Socioeconomic History    Marital status: SINGLE   Tobacco Use    Smoking status: Never Smoker    Smokeless tobacco: Never Used   Substance and Sexual Activity    Alcohol use:  Yes    Sexual activity: Yes     Partners: Female     Social History     Tobacco Use   Smoking Status Never Smoker   Smokeless Tobacco Never Used     History reviewed. No pertinent family history. ROS: The patient has no difficulty with chest pain or shortness of breath. No fever or chills. Comprehensive review of systems was otherwise unremarkable except as noted above. Physical Exam:   Visit Vitals  /77   Pulse 61   Temp 97.8 °F (36.6 °C)   Resp 18   Ht 5' 7\" (1.702 m)   Wt 167 lb (75.8 kg)   SpO2 95%   BMI 26.16 kg/m²     Vitals:    04/28/22 2353 04/29/22 0025 04/29/22 0326 04/29/22 0732   BP: 105/66 106/70 102/70 121/77   Pulse: 64 72 (!) 57 61   Resp: 18  18 18   Temp: 98 °F (36.7 °C)  98 °F (36.7 °C) 97.8 °F (36.6 °C)   SpO2: 96%  95% 95%   Weight:       Height:         No intake/output data recorded. 04/27 1901 - 04/29 0700  In: 0   Out: 79 [Drains:70]    Constitutional: Alert, oriented, cooperative patient in no acute distress; appears stated age    Eyes:Sclera are clear. EOMs intact  ENMT: no external lesions gross hearing normal; no obvious neck masses, no ear or lip lesions, nares normal  CV: RRR. Normal perfusion  Resp: No JVD. Breathing is  non-labored; no audible wheezing. GI: moderately distended. BS-. Trochar sites OK, small tape blister adjacent to umbilical incision  Pankaj drain is serous. Musculoskeletal: unremarkable with normal function. No embolic signs or cyanosis.    Neuro:  Oriented; moves all 4; no focal deficits  Psychiatric: normal affect and mood, no memory impairment    Recent vitals (if inpt):  Patient Vitals for the past 24 hrs:   BP Temp Pulse Resp SpO2   04/29/22 0732 121/77 97.8 °F (36.6 °C) 61 18 95 %   04/29/22 0326 102/70 98 °F (36.7 °C) (!) 57 18 95 %   04/29/22 0025 106/70  72     04/28/22 2353 105/66 98 °F (36.7 °C) 64 18 96 %   04/28/22 1926 115/76 98 °F (36.7 °C) 67 18 94 %   04/28/22 1612 109/71 98.5 °F (36.9 °C) 73 17 96 %   04/28/22 1200   75     04/28/22 1101 113/77 97.7 °F (36.5 °C) 72 16 96 %       Amount and/or Complexity of Data Reviewed and Analyzed:  I reviewed and analyzed all of the unique labs and radiologic studies that are shown below as well as any that are in the HPI, and any that are in the expanded problem list below  *Each unique test, order, or document contributes to the combination of 2 or combination of 3 in Category 1 below. For this visit I also reviewed old records and prior notes. Recent Labs     04/29/22  0546   WBC 9.1   HGB 11.3*         K 3.9      CO2 28   BUN 9   CREA 0.92   *     Review of most recent CBC  Lab Results   Component Value Date/Time    WBC 9.1 04/29/2022 05:46 AM    HGB 11.3 (L) 04/29/2022 05:46 AM    HCT 35.2 (L) 04/29/2022 05:46 AM    PLATELET 207 68/79/2316 05:46 AM    MCV 91.9 04/29/2022 05:46 AM       Review of most recent BMP  Lab Results   Component Value Date/Time    Sodium 137 04/29/2022 05:46 AM    Potassium 3.9 04/29/2022 05:46 AM    Chloride 105 04/29/2022 05:46 AM    CO2 28 04/29/2022 05:46 AM    Anion gap 4 (L) 04/29/2022 05:46 AM    Glucose 116 (H) 04/29/2022 05:46 AM    BUN 9 04/29/2022 05:46 AM    Creatinine 0.92 04/29/2022 05:46 AM    GFR est AA >60 04/29/2022 05:46 AM    GFR est non-AA >60 04/29/2022 05:46 AM    Calcium 9.1 04/29/2022 05:46 AM       Review of most recent LFTs (and lipase if done)  Lab Results   Component Value Date/Time    ALT (SGPT) 68 (H) 04/26/2022 03:34 AM    AST (SGOT) 65 (H) 04/26/2022 03:34 AM    Alk.  phosphatase 120 04/26/2022 03:34 AM    Bilirubin, total 0.5 04/26/2022 03:34 AM     Lab Results   Component Value Date/Time    Lipase 300 04/26/2022 03:34 AM       No results found for: INR, APTT, CBIL, LCAD, NH4, TROPT, TROIQ, INREXT, INREXT    Review of most recent HgbA1c  No results found for: HBA1C, JCS7AUWL, NNJ2UUSQ, PYJ6XETF    Nutritional assessment screen for wound healing issues:  Lab Results   Component Value Date/Time    Protein, total 7.5 04/26/2022 03:34 AM    Albumin 3.1 (L) 04/26/2022 03:34 AM       @lastcovr@  XR Results (most recent):  No results found for this or any previous visit. CT Results (most recent):  Results from Hospital Encounter encounter on 04/26/22    CT HEAD WO CONT    Narrative  EXAM: Noncontrast CT head. INDICATION: Headache and arm numbness. COMPARISON: None. TECHNIQUE: Noncontrast CT images of the head were obtained. Radiation dose  reduction techniques were used for this study. Our CT scanners use one or all  of the following:  Automated exposure control, adjustment of the mA or kV  according to patient size, iterative reconstruction. FINDINGS: Brain volume is appropriate for age. No acute infarct, hemorrhage or  mass is identified. There is no mass effect, midline shift or depressed  fracture. The visualized paranasal sinuses and mastoid air cells are clear,  except for mild mucosal thickening floor the right maxillary sinus. Impression  No acute process. US Results (most recent):  No results found for this or any previous visit.         Admission date (for inpatients): 4/26/2022   * No surgery date entered *  Procedure(s):  APPENDECTOMY LAPAROSCOPIC        ASSESSMENT/PLAN:  Problem List  Date Reviewed: 4/27/2022          Codes Class Noted    * (Principal) Sepsis with acute organ dysfunction ([facility] ICD-10-CM: A41.9, R65.20  ICD-9-CM: 038.9, 995.92  4/27/2022        Hypoxia ICD-10-CM: R09.02  ICD-9-CM: 799.02  4/27/2022        Postoperative urinary retention ICD-10-CM: N99.89, R33.8  ICD-9-CM: 997.5  4/27/2022        PAF (paroxysmal atrial fibrillation) (HCC) ICD-10-CM: I48.0  ICD-9-CM: 427.31  4/27/2022        RLQ abdominal pain ICD-10-CM: R10.31  ICD-9-CM: 789.03  4/26/2022        Peritonitis ([facility] ICD-10-CM: K65.9  ICD-9-CM: 567.9  4/26/2022        Elevated troponin ICD-10-CM: R77.8  ICD-9-CM: 790.6  4/26/2022        Leukocytosis ICD-10-CM: F33.984  ICD-9-CM: 288.60  4/26/2022        Ruptured appendicitis ICD-10-CM: K35.32  ICD-9-CM: 540.0  4/26/2022    Overview Addendum 4/27/2022  3:28 PM by Leah Wren MD     4/26/22 s/p lap appendectomy; Dr Paola Munoz  A:  \" APPENDIX\": ACUTE APPENDICITIS   Sign Out Date: 4/27/2022  KALIN Soto M.D. Principal Problem:    Sepsis with acute organ dysfunction ([address] (4/27/2022)    Active Problems:    RLQ abdominal pain (4/26/2022)      Peritonitis ([address] (4/26/2022)      Elevated troponin (4/26/2022)      Leukocytosis (4/26/2022)      Ruptured appendicitis (4/26/2022)      Overview: 4/26/22 s/p lap appendectomy; Dr Paola Munoz      A:  \" APPENDIX\": ACUTE APPENDICITIS       Sign Out Date: 4/27/2022  KALIN Soto M.D. Hypoxia (4/27/2022)      Postoperative urinary retention (4/27/2022)      PAF (paroxysmal atrial fibrillation) ([address] (4/27/2022)           Number and Complexity of Problems addressed and   Risks of complications and/or morbidity of management      Acute ruptured appendicitis with leukocytosis and guarding on admission  He is s/p laparoscopic appendectomy with drain placement for ruptured appendictis with abscess drainage on 4/26/22. POD3  AF  Much less distended but passing flatus but still quite tender in RLQ  WBC normal    IV Zosyn   4/26/22 peritoneal culture from OR with gm neg rods so far    NPO until his RLQ pain resolves  CT scan to look for undrained collections if RLQ pain does not continue to improve daily        Post op Urinary retention  Chapman had to be reinserted early post-op  He is now voiding without chapman but has some dysuria (no hematuria) which is new post-op and likely related to Chapman placement    Urology consult if complaint of dysuria doesn't improve      afib on monitor  Cardiology was consulted and plans outpt Holter      Elevated troponin. Cardiology consulted preoperatively -likely demand related in the setting of acute appendicitis possible rupture/peritonitis. Not consistent with ACS.  No further w/u warranted at this time. Echocardiogram: preserved EF and no noted wall motion abnormalities              Level of MDM (2/3 elements below)  Number and Complexity of Problems Addressed Amount and/or Complexity of Data to be Reviewed and Analyzed  *Each unique test, order, or document contributes to the combination of 2 or combination of 3 in Category 1 below.  Risk of Complications and/or Morbidity or Mortality of pt Management     11672  21783 SF Minimal  1self-limited or minor problem Minimal or none Minimal risk of morbidity from additional diagnostic testing or Rx   25150  66370 Low Low  2or more self-limited or minor problems;    or  1stable chronic illness;    or  0TWBWM, uncomplicated illness or injury   Limited  (Must meet the requirements of at least 1 of the 2 categories)  Category 1: Tests and documents   Any combination of 2 from the following:  Review of prior external note(s) from each unique source*;  review of the result(s) of each unique test*;   ordering of each unique test*    or   Category 2: Assessment requiring an independent historian(s)  (For the categories of independent interpretation of tests and discussion of management or test interpretation, see moderate or high) Low risk of morbidity from additional diagnostic testing or treatment     85787  38440 Mod Moderate  1or more chronic illnesses with exacerbation, progression, or side effects of treatment;    or  2or more stable chronic illnesses;    or  1undiagnosed new problem with uncertain prognosis;    or  1acute illness with systemic symptoms;    or  7CVVOO complicated injury   Moderate  (Must meet the requirements of at least 1 out of 3 categories)  Category 1: Tests, documents, or independent historian(s)  Any combination of 3 from the following:   Review of prior external note(s) from each unique source*;  Review of the result(s) of each unique test*;  Ordering of each unique test*;  Assessment requiring an independent historian(s)    or  Category 2: Independent interpretation of tests   Independent interpretation of a test performed by another physician/other qualified health care professional (not separately reported);     or  Category 3: Discussion of management or test interpretation  Discussion of management or test interpretation with external physician/other qualified health care professional/appropriate source (not separately reported)   Moderate risk of morbidity from additional diagnostic testing or treatment  Examples only:  Prescription drug management   Decision regarding minor surgery with identified patient or procedure risk factors  Decision regarding elective major surgery without identified patient or procedure risk factors   Diagnosis or treatment significantly limited by social determinants of health       00410  87141 High High  1or more chronic illnesses with severe exacerbation, progression, or side effects of treatment;    or  1 acute or chronic illness or injury that poses a threat to life or bodily function   Extensive  (Must meet the requirements of at least 2 out of 3 categories)  Category 1: Tests, documents, or independent historian(s)  Any combination of 3 from the following:   Review of prior external note(s) from each unique source*;  Review of the result(s) of each unique test*;   Ordering of each unique test*;   Assessment requiring an independent historian(s)    or   Category 2: Independent interpretation of tests   Independent interpretation of a test performed by another physician/other qualified health care professional (not separately reported);     or  Category 3: Discussion of management or test interpretation  Discussion of management or test interpretation with external physician/other qualified health care professional/appropriate source (not separately reported)   High risk of morbidity from additional diagnostic testing or treatment  Examples only:  Drug therapy requiring intensive monitoring for toxicity  Decision regarding elective major surgery with identified patient or procedure risk factors  Decision regarding emergency major surgery  Decision regarding hospitalization  Decision not to resuscitate or to de-escalate care because of poor prognosis             I have personally performed a face-to-face diagnostic evaluation and management  service on this patient. I have independently seen the patient. I have independently obtained the above history from the patient/family. I have independently examined the patient with above findings. I have independently reviewed data/labs for this patient and developed the above plan of care (MDM).   Signed: Enedina Douglass MD

## 2022-04-29 NOTE — PROGRESS NOTES
Medical record reviewed. Pt making progress towards discharge. CM has provided information about primary care providers for patients without insurance. Spouse confirmed that funds are available for prescriptions and she will provide transport home. CM will continue to follow to assist as needed.

## 2022-04-29 NOTE — PROGRESS NOTES
Nutrition:  Day 3 NPO/Clear liquid diet status identified as per standard of care monitoring    Patient with no nutrition risk factors identified per admission malnutrition screening tool. P/W 5 day h/o progressive, constant, severe RLQ pain. He had associated fever but no N/V. Findings of ruptured appendicitis, s/p lap appy and drainage of abscess 4/26. NPO/Clear liquid diet status is r/t above . It is reasonable to wait 7-10 days before initiating TPN in a patient with no or low nutrition risk. Expect po diet will be able to be started within this timeframe. Will await start or progression of po diet as medical condition dictates. Otherwise, F/U will be per standard of care or by MD consult if prior.     James Edmond, [address] [place] [address], [address] [place] [address]

## 2022-04-29 NOTE — PROGRESS NOTES
[facility] CARDIOLOGY PROGRESS NOTE           4/28/2022 6:24 PM    Admit Date: 4/26/2022      Subjective:     Back in sinus rhythm. Noted atrial fibrillation with RVR from 4/27/2022. On room air. Drain in place for surgical    ROS:  Cardiovascular:  As noted above    Objective:      Vitals:    04/28/22 1101 04/28/22 1200 04/28/22 1612 04/28/22 1926   BP: 113/77  109/71 115/76   Pulse: 72 75 73 67   Resp: 16  17 18   Temp: 97.7 °F (36.5 °C)  98.5 °F (36.9 °C) 98 °F (36.7 °C)   SpO2: 96%  96% 94%   Weight:       Height:           Physical Exam:  General-No Acute Distress  Neck- supple, no JVD  CV- regular rate and rhythm no MRG  Lung- clear bilaterally  Abd- soft, nontender, nondistended  Ext- no edema bilaterally. Skin- warm and dry    Data Review:   Recent Labs     04/28/22  0611 04/27/22  0425    138   K 4.0 3.8   BUN 9 9   CREA 0.95 0.93   * 100   WBC 8.9 11.9*   HGB 11.2* 11.5*   HCT 35.1* 36.1*    249   CHOL 165  --    LDLC 112.2*  --    HDL 16*  --        Assessment/Plan:     Principal Problem:    Ruptured appendicitis (4/26/2022)  -Management per surgery. Status post laparoscopic appendectomy with drain placement. On IV abx per surgery. Paroxysmal atrial fibrillation  -Triggered by acute underlying issues. Back in sinus rhythm.  -Continue on telemetry. Discussed not indicative of long-term recurrence.  -Plan extended Holter in the outpatient setting. [id number] score of 0  -Maintain electrolytes with K>4, Mg >2     Active Problems:    RLQ abdominal pain (4/26/2022)       Peritonitis ([address] (4/26/2022)       Elevated troponin (4/26/2022)  -likely demand related in the setting of acute appendicitis possible rupture/peritonitis. Not consistent with ACS.   -EKG reviewed with noted repolarization variant.  -Echocardiogram with preserved EF and no noted wall motion abnormalities.  -No further cardiac work-up warranted at this time.       Leukocytosis (4/26/2022)    No further cardiac work-up needed at this time. Will sign off. Can follow-up in the outpatient setting postdischarge.   Please call if questions  Kyung Bryant MD  4/28/2022 6:24 PM

## 2022-04-30 LAB
ANION GAP SERPL CALC-SCNC: 6 MMOL/L (ref 7–16)
BACTERIA SPEC CULT: ABNORMAL
BACTERIA SPEC CULT: ABNORMAL
BUN SERPL-MCNC: 9 MG/DL (ref 6–23)
CALCIUM SERPL-MCNC: 9.6 MG/DL (ref 8.3–10.4)
CHLORIDE SERPL-SCNC: 105 MMOL/L (ref 98–107)
CO2 SERPL-SCNC: 28 MMOL/L (ref 21–32)
CREAT SERPL-MCNC: 1.05 MG/DL (ref 0.8–1.5)
ERYTHROCYTE [DISTWIDTH] IN BLOOD BY AUTOMATED COUNT: 13 % (ref 11.9–14.6)
GLUCOSE SERPL-MCNC: 108 MG/DL (ref 65–100)
GRAM STN SPEC: ABNORMAL
HCT VFR BLD AUTO: 39.1 % (ref 41.1–50.3)
HGB BLD-MCNC: 12.6 G/DL (ref 13.6–17.2)
MCH RBC QN AUTO: 29.6 PG (ref 26.1–32.9)
MCHC RBC AUTO-ENTMCNC: 32.2 G/DL (ref 31.4–35)
MCV RBC AUTO: 91.8 FL (ref 79.6–97.8)
NRBC # BLD: 0 K/UL (ref 0–0.2)
PLATELET # BLD AUTO: 379 K/UL (ref 150–450)
PMV BLD AUTO: 10.3 FL (ref 9.4–12.3)
POTASSIUM SERPL-SCNC: 3.9 MMOL/L (ref 3.5–5.1)
RBC # BLD AUTO: 4.26 M/UL (ref 4.23–5.6)
SERVICE CMNT-IMP: ABNORMAL
SODIUM SERPL-SCNC: 139 MMOL/L (ref 136–145)
WBC # BLD AUTO: 8.1 K/UL (ref 4.3–11.1)

## 2022-04-30 PROCEDURE — 74011000258 HC RX REV CODE- 258: Performed by: SURGERY

## 2022-04-30 PROCEDURE — 74011000250 HC RX REV CODE- 250: Performed by: SURGERY

## 2022-04-30 PROCEDURE — 74011250637 HC RX REV CODE- 250/637: Performed by: INTERNAL MEDICINE

## 2022-04-30 PROCEDURE — 85027 COMPLETE CBC AUTOMATED: CPT

## 2022-04-30 PROCEDURE — 74011250637 HC RX REV CODE- 250/637: Performed by: NURSE PRACTITIONER

## 2022-04-30 PROCEDURE — 74011250636 HC RX REV CODE- 250/636: Performed by: SURGERY

## 2022-04-30 PROCEDURE — 65270000029 HC RM PRIVATE

## 2022-04-30 PROCEDURE — 36415 COLL VENOUS BLD VENIPUNCTURE: CPT

## 2022-04-30 PROCEDURE — 2709999900 HC NON-CHARGEABLE SUPPLY

## 2022-04-30 PROCEDURE — 80048 BASIC METABOLIC PNL TOTAL CA: CPT

## 2022-04-30 RX ADMIN — DEXTROSE MONOHYDRATE, SODIUM CHLORIDE, AND POTASSIUM CHLORIDE 100 ML/HR: 50; 4.5; 1.49 INJECTION, SOLUTION INTRAVENOUS at 17:25

## 2022-04-30 RX ADMIN — PIPERACILLIN AND TAZOBACTAM 4.5 G: 4; .5 INJECTION, POWDER, LYOPHILIZED, FOR SOLUTION INTRAVENOUS at 20:55

## 2022-04-30 RX ADMIN — PIPERACILLIN AND TAZOBACTAM 4.5 G: 4; .5 INJECTION, POWDER, LYOPHILIZED, FOR SOLUTION INTRAVENOUS at 12:39

## 2022-04-30 RX ADMIN — PIPERACILLIN AND TAZOBACTAM 4.5 G: 4; .5 INJECTION, POWDER, LYOPHILIZED, FOR SOLUTION INTRAVENOUS at 04:05

## 2022-04-30 RX ADMIN — METOPROLOL SUCCINATE 25 MG: 25 TABLET, EXTENDED RELEASE ORAL at 08:43

## 2022-04-30 RX ADMIN — SODIUM CHLORIDE, PRESERVATIVE FREE 20 MG: 5 INJECTION INTRAVENOUS at 08:44

## 2022-04-30 RX ADMIN — DEXTROSE MONOHYDRATE, SODIUM CHLORIDE, AND POTASSIUM CHLORIDE 100 ML/HR: 50; 4.5; 1.49 INJECTION, SOLUTION INTRAVENOUS at 08:40

## 2022-04-30 RX ADMIN — SODIUM CHLORIDE, PRESERVATIVE FREE 10 ML: 5 INJECTION INTRAVENOUS at 15:15

## 2022-04-30 RX ADMIN — TAMSULOSIN HYDROCHLORIDE 0.4 MG: 0.4 CAPSULE ORAL at 08:43

## 2022-04-30 RX ADMIN — DOCUSATE SODIUM 50MG AND SENNOSIDES 8.6MG 1 TABLET: 8.6; 5 TABLET, FILM COATED ORAL at 22:28

## 2022-04-30 RX ADMIN — SODIUM CHLORIDE, PRESERVATIVE FREE 20 MG: 5 INJECTION INTRAVENOUS at 20:54

## 2022-04-30 NOTE — PROGRESS NOTES
H&P/Consult Note/Progress Note/Office Note:   Jason Avila  MRN: [id number]  :1980  Age:41 y.o. S/ feeling better this am  No abdominal pain  Constant flatus and BM last night   Current Facility-Administered Medications   Medication Dose Route Frequency    piperacillin-tazobactam (ZOSYN) 4.5 g in 0.9% sodium chloride (MBP/ADV) 100 mL MBP  4.5 g IntraVENous Q8H    dextrose 5% - 0.45% NaCl with KCl 20 mEq/L infusion  100 mL/hr IntraVENous CONTINUOUS    tamsulosin (FLOMAX) capsule 0.4 mg  0.4 mg Oral DAILY    metoprolol succinate (TOPROL-XL) XL tablet 25 mg  25 mg Oral DAILY    phenazopyridine (PYRIDIUM) tab 95 mg  95 mg Oral TID PRN    senna-docusate (PERICOLACE) 8.6-50 mg per tablet 1 Tablet  1 Tablet Oral QHS    sodium chloride (NS) flush 5-10 mL  5-10 mL IntraVENous Q8H    sodium chloride (NS) flush 5-10 mL  5-10 mL IntraVENous PRN    famotidine (PF) (PEPCID) 20 mg in 0.9% sodium chloride 10 mL injection  20 mg IntraVENous Q12H    morphine injection 2-6 mg  2-6 mg IntraVENous Q1H PRN    oxyCODONE IR (ROXICODONE) tablet 5-10 mg  5-10 mg Oral Q4H PRN    acetaminophen (TYLENOL) tablet 1,000 mg  1,000 mg Oral Q6H PRN     Patient has no known allergies. Social History     Socioeconomic History    Marital status: SINGLE   Tobacco Use    Smoking status: Never Smoker    Smokeless tobacco: Never Used   Substance and Sexual Activity    Alcohol use:  Yes    Sexual activity: Yes     Partners: Female     Social History     Tobacco Use   Smoking Status Never Smoker   Smokeless Tobacco Never Used         Physical Exam:   Visit Vitals  /72   Pulse 64   Temp 98.9 °F (37.2 °C)   Resp 17   Ht 5' 7\" (1.702 m)   Wt 68.7 kg (151 lb 6.4 oz)   SpO2 96%   BMI 23.71 kg/m²     Vitals:    22 2340 22 0354 22 0427 22 0746   BP: (!) 105/59 103/70  110/72   Pulse: (!) 53 61  64   Resp: 18 19  17   Temp: 97.9 °F (36.6 °C) 98.3 °F (36.8 °C)  98.9 °F (37.2 °C)   SpO2: 96% 94% 96%   Weight:   68.7 kg (151 lb 6.4 oz)    Height:         No intake/output data recorded. 04/28 1901 - 04/30 0700  In: 69824 [I.V.:86167]  Out: 1057.5 [Urine:1050; Drains:7.5]    Constitutional: Alert, oriented, cooperative patient in no acute distress; appears stated age    Eyes:Sclera are clear. EOMs intact  ENMT: no external lesions gross hearing normal; no obvious neck masses, no ear or lip lesions, nares normal  CV: RRR. Normal perfusion  Resp: No JVD. Breathing is  non-labored; no audible wheezing. GI: soft , minimally distended. Incisions OK, drain in place      Musculoskeletal: unremarkable with normal function. No embolic signs or cyanosis.    Neuro:  Oriented; moves all 4; no focal deficits  Psychiatric: normal affect and mood, no memory impairment    Recent vitals (if inpt):  Patient Vitals for the past 24 hrs:   BP Temp Pulse Resp SpO2 Weight   04/30/22 0746 110/72 98.9 °F (37.2 °C) 64 17 96 %    04/30/22 0427      68.7 kg (151 lb 6.4 oz)   04/30/22 0354 103/70 98.3 °F (36.8 °C) 61 19 94 %    04/29/22 2340 (!) 105/59 97.9 °F (36.6 °C) (!) 53 18 96 %    04/29/22 1948 110/70 98.3 °F (36.8 °C) 60 16 96 %    04/29/22 1511 110/68 98 °F (36.7 °C) 65 20 95 %    04/29/22 1121 111/80 98.1 °F (36.7 °C) 60 18 96 %          Recent Labs     04/30/22  0443   WBC 8.1   HGB 12.6*         K 3.9      CO2 28   BUN 9   CREA 1.05   *     Review of most recent CBC  Lab Results   Component Value Date/Time    WBC 8.1 04/30/2022 04:43 AM    HGB 12.6 (L) 04/30/2022 04:43 AM    HCT 39.1 (L) 04/30/2022 04:43 AM    PLATELET 358 48/77/9846 04:43 AM    MCV 91.8 04/30/2022 04:43 AM       Review of most recent BMP  Lab Results   Component Value Date/Time    Sodium 139 04/30/2022 04:43 AM    Potassium 3.9 04/30/2022 04:43 AM    Chloride 105 04/30/2022 04:43 AM    CO2 28 04/30/2022 04:43 AM    Anion gap 6 (L) 04/30/2022 04:43 AM    Glucose 108 (H) 04/30/2022 04:43 AM    BUN 9 04/30/2022 04:43 AM Creatinine 1.05 04/30/2022 04:43 AM    GFR est AA >60 04/30/2022 04:43 AM    GFR est non-AA >60 04/30/2022 04:43 AM    Calcium 9.6 04/30/2022 04:43 AM       Review of most recent LFTs (and lipase if done)  Lab Results   Component Value Date/Time    ALT (SGPT) 68 (H) 04/26/2022 03:34 AM    AST (SGOT) 65 (H) 04/26/2022 03:34 AM    Alk. phosphatase 120 04/26/2022 03:34 AM    Bilirubin, total 0.5 04/26/2022 03:34 AM     Lab Results   Component Value Date/Time    Lipase 300 04/26/2022 03:34 AM       No results found for: INR, APTT, CBIL, LCAD, NH4, TROPT, TROIQ, INREXT    Review of most recent HgbA1c  No results found for: HBA1C, YXO6NSKZ, LUR0NMJX    Nutritional assessment screen for wound healing issues:  Lab Results   Component Value Date/Time    Protein, total 7.5 04/26/2022 03:34 AM    Albumin 3.1 (L) 04/26/2022 03:34 AM       A/P POD # 4 , s/p lap appy for perforated appendicitis    Doing well  Normal WBC, good urine output.  Scant serous drainage (MARY drain)  Ileus resolving    -advance diet

## 2022-04-30 NOTE — PROGRESS NOTES
Resting quietly, arousing at frequent intervals, voiding, passing gas, having BMs with no c/o, denying pain or discomfort. Dsgs remain clean, dry, intact, MARY intact, charged, output 7.5 ml during the night. No distress. Report given to Devin Lozano RN.

## 2022-05-01 VITALS
BODY MASS INDEX: 23.75 KG/M2 | WEIGHT: 151.3 LBS | OXYGEN SATURATION: 97 % | SYSTOLIC BLOOD PRESSURE: 102 MMHG | TEMPERATURE: 98.2 F | HEIGHT: 67 IN | HEART RATE: 57 BPM | DIASTOLIC BLOOD PRESSURE: 61 MMHG | RESPIRATION RATE: 16 BRPM

## 2022-05-01 LAB
ANION GAP SERPL CALC-SCNC: 7 MMOL/L (ref 7–16)
BACTERIA SPEC CULT: NORMAL
BACTERIA SPEC CULT: NORMAL
BUN SERPL-MCNC: 8 MG/DL (ref 6–23)
CALCIUM SERPL-MCNC: 9.5 MG/DL (ref 8.3–10.4)
CHLORIDE SERPL-SCNC: 108 MMOL/L (ref 98–107)
CO2 SERPL-SCNC: 25 MMOL/L (ref 21–32)
CREAT SERPL-MCNC: 1.1 MG/DL (ref 0.8–1.5)
ERYTHROCYTE [DISTWIDTH] IN BLOOD BY AUTOMATED COUNT: 12.9 % (ref 11.9–14.6)
GLUCOSE SERPL-MCNC: 110 MG/DL (ref 65–100)
HCT VFR BLD AUTO: 40.9 % (ref 41.1–50.3)
HGB BLD-MCNC: 13.2 G/DL (ref 13.6–17.2)
MCH RBC QN AUTO: 29.5 PG (ref 26.1–32.9)
MCHC RBC AUTO-ENTMCNC: 32.3 G/DL (ref 31.4–35)
MCV RBC AUTO: 91.5 FL (ref 79.6–97.8)
NRBC # BLD: 0 K/UL (ref 0–0.2)
PLATELET # BLD AUTO: 412 K/UL (ref 150–450)
PMV BLD AUTO: 10.2 FL (ref 9.4–12.3)
POTASSIUM SERPL-SCNC: 4 MMOL/L (ref 3.5–5.1)
RBC # BLD AUTO: 4.47 M/UL (ref 4.23–5.6)
SERVICE CMNT-IMP: NORMAL
SERVICE CMNT-IMP: NORMAL
SODIUM SERPL-SCNC: 140 MMOL/L (ref 136–145)
WBC # BLD AUTO: 8.9 K/UL (ref 4.3–11.1)

## 2022-05-01 PROCEDURE — 85027 COMPLETE CBC AUTOMATED: CPT

## 2022-05-01 PROCEDURE — 36415 COLL VENOUS BLD VENIPUNCTURE: CPT

## 2022-05-01 PROCEDURE — 74011250637 HC RX REV CODE- 250/637: Performed by: INTERNAL MEDICINE

## 2022-05-01 PROCEDURE — 74011250637 HC RX REV CODE- 250/637: Performed by: NURSE PRACTITIONER

## 2022-05-01 PROCEDURE — 74011250636 HC RX REV CODE- 250/636: Performed by: SURGERY

## 2022-05-01 PROCEDURE — 74011000258 HC RX REV CODE- 258: Performed by: SURGERY

## 2022-05-01 PROCEDURE — 80048 BASIC METABOLIC PNL TOTAL CA: CPT

## 2022-05-01 PROCEDURE — 74011000250 HC RX REV CODE- 250: Performed by: SURGERY

## 2022-05-01 RX ADMIN — DEXTROSE MONOHYDRATE, SODIUM CHLORIDE, AND POTASSIUM CHLORIDE 100 ML/HR: 50; 4.5; 1.49 INJECTION, SOLUTION INTRAVENOUS at 03:30

## 2022-05-01 RX ADMIN — SODIUM CHLORIDE, PRESERVATIVE FREE 20 MG: 5 INJECTION INTRAVENOUS at 08:25

## 2022-05-01 RX ADMIN — DEXTROSE MONOHYDRATE, SODIUM CHLORIDE, AND POTASSIUM CHLORIDE 100 ML/HR: 50; 4.5; 1.49 INJECTION, SOLUTION INTRAVENOUS at 11:21

## 2022-05-01 RX ADMIN — METOPROLOL SUCCINATE 25 MG: 25 TABLET, EXTENDED RELEASE ORAL at 08:25

## 2022-05-01 RX ADMIN — TAMSULOSIN HYDROCHLORIDE 0.4 MG: 0.4 CAPSULE ORAL at 08:25

## 2022-05-01 RX ADMIN — PIPERACILLIN AND TAZOBACTAM 4.5 G: 4; .5 INJECTION, POWDER, LYOPHILIZED, FOR SOLUTION INTRAVENOUS at 03:30

## 2022-05-01 RX ADMIN — PIPERACILLIN AND TAZOBACTAM 4.5 G: 4; .5 INJECTION, POWDER, LYOPHILIZED, FOR SOLUTION INTRAVENOUS at 11:14

## 2022-05-01 NOTE — DISCHARGE SUMMARY
Physician Discharge Summary     Patient ID:  Rachana Geller  [id number]  46 y.o.  1980    Allergies: Patient has no known allergies. Admit Date: 4/26/2022    Discharge Date: 5/1/2022     HPI: Rachana Geller is a 46 y.o. male who came to the ER on 4/26/22 with a 5 day h/o progressive, constant, severe RLQ pain. He had associated fever but no N/V. No prior abd surgery  WBC 18k  CT as below  Gen Surgery was consulted.     Troponin checked secondary to left arm pain and was elevated in ER  Pt denies chest pain  ER contacted [facility] and spoke with Dr. Tonita Felty. ER reported cardiology was not concerned about his heart and recommended echo but not to delay the care he needs in regards to his underlying appendicitis           4/26/22 CT abd/pelvis with IV contrast  - Liver: Within normal limits. - Gallbladder and bile ducts: Within normal limits. - Spleen: Within normal limits. - Urinary tract: Unremarkable except for a tiny left kidney stone. - Adrenals: Within normal limits. - Pancreas: Within normal limits.     - Gastrointestinal tract: The appendix extends inferiorly from the cecum and  contains small calcified appendicoliths. There is thickened at 2 cm in diameter,  with surrounding inflammation and trace fluid. There are also a few suspected  bubbles of extraluminal air raising the possibility of perforation as well. No abscess or bowel obstruction is seen. The colon and small bowel loops are unremarkable.     - Retroperitoneum: Within normal limits. - Peritoneal cavity and abdominal wall: Within normal limits. - Pelvis: Within normal limits. - Spine/bones: No acute process. - Other comments: The lung bases are clear.     IMPRESSION  Acute appendicitis, with a few suspected tiny bubbles of extraluminal air raising the possibility of perforation. Pt underwent Laparoscopic Appendectomy with drain placement 4/26/22 by Dr. Blayne Colbert.     * Admission Diagnoses: Ruptured appendicitis [K35.32]    * Discharge Diagnoses:    Hospital Problems as of 5/1/2022 Date Reviewed: 4/27/2022          Codes Class Noted - Resolved POA    * (Principal) Sepsis with acute organ dysfunction ([facility] ICD-10-CM: A41.9, R65.20  ICD-9-CM: 038.9, 995.92  4/27/2022 - Present No        Hypoxia ICD-10-CM: R09.02  ICD-9-CM: 799.02  4/27/2022 - Present Yes        Postoperative urinary retention ICD-10-CM: N99.89, R33.8  ICD-9-CM: 997.5  4/27/2022 - Present No        PAF (paroxysmal atrial fibrillation) (HCC) ICD-10-CM: I48.0  ICD-9-CM: 427.31  4/27/2022 - Present Unknown        RLQ abdominal pain ICD-10-CM: R10.31  ICD-9-CM: 789.03  4/26/2022 - Present Yes        Peritonitis ([facility] ICD-10-CM: K65.9  ICD-9-CM: 567.9  4/26/2022 - Present Yes        Elevated troponin ICD-10-CM: R77.8  ICD-9-CM: 790.6  4/26/2022 - Present Yes        Leukocytosis ICD-10-CM: D72.829  ICD-9-CM: 288.60  4/26/2022 - Present Yes        Ruptured appendicitis ICD-10-CM: K35.32  ICD-9-CM: 540.0  4/26/2022 - Present Yes    Overview Addendum 4/27/2022  3:28 PM by Keven Hannon MD     4/26/22 s/p lap appendectomy; Dr Sowmya Koehler  A:  \" APPENDIX\": ACUTE APPENDICITIS   Sign Out Date: 4/27/2022  KALIN Eagle M.D. RESOLVED: Acute appendicitis with rupture ICD-10-CM: K35.32  ICD-9-CM: 540.0  4/26/2022 - 4/26/2022 Yes               Admission Condition: Serious    * Discharge Condition: good and improved    * Procedures: Procedure(s):  [address] Course:   Normal hospital course for this procedure. Consults: None    Significant Diagnostic Studies: labs and radiology    * Disposition: Home    Discharge Medications:   Current Discharge Medication List      START taking these medications    Details   HYDROcodone-acetaminophen (Norco) 5-325 mg per tablet Take 1-2 Tablets by mouth every eight (8) hours as needed for Pain for up to 7 days. Max Daily Amount: 6 Tablets.   Qty: 28 Tablet, Refills: 0  Start date: 4/26/2022, End date: 5/3/2022    Associated Diagnoses: Ruptured appendicitis         CONTINUE these medications which have NOT CHANGED    Details   amoxicillin-clavulanate (Augmentin) 875-125 mg per tablet Take 1 Tablet by mouth two (2) times a day. ondansetron hcl (Zofran) 4 mg tablet Take 4 mg by mouth every eight (8) hours as needed for Nausea or Vomiting. diclofenac EC (VOLTAREN) 50 mg EC tablet Take 50 mg by mouth two (2) times a day. famotidine (Pepcid) 20 mg tablet Take 20 mg by mouth two (2) times a day. * Follow-up Care/Patient Instructions: Activity: Activity as tolerated and No heavy lifting for 6 weeks  Diet: Soft   Wound Care: As directed    Dressings/Wound Care  Keep incisions covered with dry gauze and tape until follow-up  Try to keep incisions as dry as possible to lower risk of infection. Drain Care  Empty drain, Measure drain output, and record output two times daily. Activity  No heavy lifting (>5lbs) for 6 weeks to reduce risk of developing a hernia in the incisions. No driving until you are off pain meds for 24hrs and have no pain with movements associated with driving. Pain prescription (Norco) electronically sent to your pharmacy      Follow-up with [facility] in next 2-3 weeks as they recommended for Holter monitor for your atrial fibrillation      Follow-up with Dr William Antonio nurse practitioner Shaan Fontanez in approx 10  in the office on a Monday. See Dr Sheri Lozano as needed after that visit.   [address], [address]  (Call for an appt time unless one is already made for you by discharge nurse which is preferred -->[phone]-->option 1)    Diet  Soft Diet     Augmentin take 1 by mouth twice a day for 5 days    Signed:  Madalyn Garsia NP  5/1/2022  3:15 PM

## 2022-05-01 NOTE — PROGRESS NOTES
Patient is anticipated to discharge today. Plan for discharge is as follows:    Care Management Interventions  PCP Verified by CM: No (pt does not currently have a PCP / information provided about area clinics )  Mode of Transport at Discharge: Self  Transition of Care Consult (CM Consult): Discharge Planning  Discharge Durable Medical Equipment: No  Physical Therapy Consult: No  Occupational Therapy Consult: No  Speech Therapy Consult: No  Support Systems: Spouse/Significant Other  Confirm Follow Up Transport: Family  The Plan for Transition of Care is Related to the Following Treatment Goals : discharge home with family  The Patient and/or Patient Representative was Provided with a Choice of Provider and Agrees with the Discharge Plan?:  (n/a)  Name of the Patient Representative Who was Provided with a Choice of Provider and Agrees with the Discharge Plan:  (n/a)  Freedom of Choice List was Provided with Basic Dialogue that Supports the Patient's Individualized Plan of Care/Goals, Treatment Preferences and Shares the Quality Data Associated with the Providers?:  (n/a)  Discharge Location  Patient Expects to be Discharged to[de-identified] Home with family assistance    Milestones and interventions have been entered.      Patricia Khalil, ANTONIO    [facility] [place] [place]    * [email or web address]

## 2022-05-01 NOTE — PROGRESS NOTES
Patient given discharge instructions. PIV removed. Educated on MARY drain. No needs or concerns at this time.

## 2022-05-01 NOTE — PROGRESS NOTES
[address] May 1, 2022       RE: Amparo Schwartz      To Whom It May Concern,    This is to certify that Nicole Zamudio excused from  work  From 04/26/2022 through 5/09/2022. Please feel free to contact my office if you have any questions or concerns. Thank you for your assistance in this matter.         [phone]

## 2022-05-02 LAB
BACTERIA SPEC CULT: NORMAL
SERVICE CMNT-IMP: NORMAL

## 2022-05-03 NOTE — PROGRESS NOTES
Physician Progress Note      PATIENT:               Bernardo Blake  CSN #:                  [id number]  :                       1980  ADMIT DATE:       2022 3:10 AM  DISCH DATE:        2022 5:22 PM  RESPONDING  PROVIDER #:        Alexander Weems MD          QUERY TEXT:    Patient admitted with Ruptured appendicitis. Noted documentation of sepsis in [facility] on 2022. If possible, please document in progress notes and discharge summary the source of sepsis:    The medical record reflects the following:  Risk Factors: 41 YOM,  Clinical Indicators: RLQ Pain, N/V, subjective chills/fever intermittent since Friday, started in epigastric region progressed RLQ,   ED Note: + chills, fatigue, fever, abd pain, nausea 99.5 98 16 129/66 97% BMI 26.16   CTAP: Acute appendicitis, with a few suspected tiny bubbles of extraluminal air raising the possibility of perforation  Treatment: Monitoring, Laparoscopic Appendectomy with drain placement, IV Zosyn, 3000cc NS IV Bolus, LR @ 150cc/hr,    Thank you,  Edin Abbott RN,C BSN  Clinical   [email or web address]  Options provided:  -- Sepsis, present on admission, due to, Please document source. -- Sepsis, not present on admission due to, Please document source. -- Other - I will add my own diagnosis  -- Disagree - Not applicable / Not valid  -- Disagree - Clinically unable to determine / Unknown  -- Refer to Clinical Documentation Reviewer    PROVIDER RESPONSE TEXT:    This patient has sepsis which was present on admission due to perforated gangrenous appendicitis    Query created by:  Kami Ley on 5/3/2022 4:48 PM      Electronically signed by:  Alexander Weems MD 5/3/2022 5:08 PM

## 2022-05-27 PROBLEM — R77.8 ELEVATED TROPONIN: Status: RESOLVED | Noted: 2022-04-26 | Resolved: 2022-05-27

## (undated) DEVICE — NEEDLE HYPO 21GA L1.5IN GRN POLYPR HUB S STL REG BVL STR

## (undated) DEVICE — SUTURE SZ 0 27IN 5/8 CIR UR-6  TAPER PT VIOLET ABSRB VICRYL J603H

## (undated) DEVICE — TROCARS: Brand: KII® BLUNT TIP ACCESS SYSTEM

## (undated) DEVICE — PAD,NON-ADHERENT,3X8,STERILE,LF,1/PK: Brand: MEDLINE

## (undated) DEVICE — GENERAL LAPAROSCOPY: Brand: MEDLINE INDUSTRIES, INC.

## (undated) DEVICE — RESERVOIR,SUCTION,100CC,SILICONE: Brand: MEDLINE

## (undated) DEVICE — LAPAROSCOPIC TROCAR SLEEVE/SINGLE USE: Brand: KII® OPTICAL ACCESS SYSTEM

## (undated) DEVICE — SOLUTION IRRIG 3000ML 0.9% SOD CHL USP UROMATIC PLAS CONT

## (undated) DEVICE — 3M™ TEGADERM™ TRANSPARENT FILM DRESSING FRAME STYLE, 1624W, 2-3/8 IN X 2-3/4 IN (6 CM X 7 CM), 100/CT 4CT/CASE: Brand: 3M™ TEGADERM™

## (undated) DEVICE — CANISTER, RIGID, 2000CC: Brand: MEDLINE INDUSTRIES, INC.

## (undated) DEVICE — TRAY CATH 16F DRN BG LTX -- CONVERT TO ITEM 363158

## (undated) DEVICE — TROCAR: Brand: KII® OPTICAL ACCESS SYSTEM

## (undated) DEVICE — GLOVE SURG SZ 65 THK91MIL LTX FREE SYN POLYISOPRENE

## (undated) DEVICE — SUTURE PERMAHAND SZ 2-0 L18IN NONABSORBABLE BLK L26MM PS 1588H

## (undated) DEVICE — LOGICUT SCISSOR LENGTH 320MM: Brand: LOGI - LAPAROSCOPIC INSTRUMENT SYSTEM

## (undated) DEVICE — TROCAR: Brand: KII FIOS FIRST ENTRY

## (undated) DEVICE — GLOVE SURG SZ 7 L12IN FNGR THK79MIL GRN LTX FREE

## (undated) DEVICE — 2, DISPOSABLE SUCTION/IRRIGATOR WITHOUT DISPOSABLE TIP: Brand: STRYKEFLOW

## (undated) DEVICE — TOTAL TRAY, DB, 100% SILI FOLEY, 16FR 10: Brand: MEDLINE

## (undated) DEVICE — SEALER ENDOSCP L37CM NANO COAT BLNT TIP LAP DIV

## (undated) DEVICE — TROCAR: Brand: KII® SLEEVE

## (undated) DEVICE — GAUZE,SPONGE,4"X4",16PLY,STRL,LF,10/TRAY: Brand: MEDLINE

## (undated) DEVICE — CUTTER ENDOSCP L340MM LIN ARTC SGL STROKE FIRING ENDOPATH

## (undated) DEVICE — CONTAINER SPEC FRMLN 120ML --

## (undated) DEVICE — RELOAD STPL SZ 0 L45MM DIA3.5MM 0DEG STD REG TISS BLU TI

## (undated) DEVICE — 3M™ TEGADERM™ TRANSPARENT FILM DRESSING FRAME STYLE, 1626W, 4 IN X 4-3/4 IN (10 CM X 12 CM), 50/CT 4CT/CASE: Brand: 3M™ TEGADERM™

## (undated) DEVICE — INSTRUMENT REPROC SEAL/DIVIDE LAP BLUNT TP LIGASURE NANO-COAT 5MMX37CM

## (undated) DEVICE — TRAY PREP DRY W/ PREM GLV 2 APPL 6 SPNG 2 UNDPD 1 OVERWRAP

## (undated) DEVICE — Device

## (undated) DEVICE — SOLUTION IRRIG 1000ML 0.9% SOD CHL USP POUR PLAS BTL

## (undated) DEVICE — GLOVE SURG SZ 65 CRM LTX FREE POLYISOPRENE POLYMER BEAD ANTI

## (undated) DEVICE — BAG SPEC REM 224ML W4XL6IN DIA10MM 1 HND GYN DISP ENDOPCH

## (undated) DEVICE — [HIGH FLOW INSUFFLATOR,  DO NOT USE IF PACKAGE IS DAMAGED,  KEEP DRY,  KEEP AWAY FROM SUNLIGHT,  PROTECT FROM HEAT AND RADIOACTIVE SOURCES.]: Brand: PNEUMOSURE

## (undated) DEVICE — DRAIN SURG 19FR 0.25IN SIL RND W/ TRCR INDIC DOT RADPQ FULL